# Patient Record
Sex: MALE | Race: BLACK OR AFRICAN AMERICAN | NOT HISPANIC OR LATINO | Employment: UNEMPLOYED | URBAN - METROPOLITAN AREA
[De-identification: names, ages, dates, MRNs, and addresses within clinical notes are randomized per-mention and may not be internally consistent; named-entity substitution may affect disease eponyms.]

---

## 2023-04-25 LAB
ANION GAP IN SER/PLAS: 10 MMOL/L (ref 10–20)
CALCIUM (MG/DL) IN SER/PLAS: 8.9 MG/DL (ref 8.6–10.3)
CARBON DIOXIDE, TOTAL (MMOL/L) IN SER/PLAS: 30 MMOL/L (ref 21–32)
CHLORIDE (MMOL/L) IN SER/PLAS: 103 MMOL/L (ref 98–107)
CREATININE (MG/DL) IN SER/PLAS: 1.17 MG/DL (ref 0.5–1.3)
ERYTHROCYTE DISTRIBUTION WIDTH (RATIO) BY AUTOMATED COUNT: 13.9 % (ref 11.5–14.5)
ERYTHROCYTE MEAN CORPUSCULAR HEMOGLOBIN CONCENTRATION (G/DL) BY AUTOMATED: 31.2 G/DL (ref 32–36)
ERYTHROCYTE MEAN CORPUSCULAR VOLUME (FL) BY AUTOMATED COUNT: 76 FL (ref 80–100)
ERYTHROCYTES (10*6/UL) IN BLOOD BY AUTOMATED COUNT: 5.68 X10E12/L (ref 4.5–5.9)
GFR MALE: 79 ML/MIN/1.73M2
GLUCOSE (MG/DL) IN SER/PLAS: 89 MG/DL (ref 74–99)
HEMATOCRIT (%) IN BLOOD BY AUTOMATED COUNT: 42.9 % (ref 41–52)
HEMOGLOBIN (G/DL) IN BLOOD: 13.4 G/DL (ref 13.5–17.5)
LEUKOCYTES (10*3/UL) IN BLOOD BY AUTOMATED COUNT: 3.6 X10E9/L (ref 4.4–11.3)
PLATELETS (10*3/UL) IN BLOOD AUTOMATED COUNT: 274 X10E9/L (ref 150–450)
POTASSIUM (MMOL/L) IN SER/PLAS: 3.7 MMOL/L (ref 3.5–5.3)
SODIUM (MMOL/L) IN SER/PLAS: 139 MMOL/L (ref 136–145)
UREA NITROGEN (MG/DL) IN SER/PLAS: 15 MG/DL (ref 6–23)

## 2023-04-26 LAB
CHLAMYDIA TRACH., AMPLIFIED: NEGATIVE
ESTIMATED AVERAGE GLUCOSE FOR HBA1C: 117 MG/DL
HEMOGLOBIN A1C/HEMOGLOBIN TOTAL IN BLOOD: 5.7 %
HIV 1/ 2 AG/AB SCREEN: NONREACTIVE
N. GONORRHEA, AMPLIFIED: NEGATIVE
SYPHILIS TOTAL AB: NONREACTIVE
TRICHOMONAS VAGINALIS: NEGATIVE
URINE CULTURE: NO GROWTH

## 2023-05-09 LAB
ALPHA-1 FETOPROTEIN (NG/ML) IN SER/PLAS: <4 NG/ML (ref 0–9)
DEHYDROEPIANDROSTERONE SULFATE (DHEA-S) (UG/DL) IN SER/: 168 UG/DL (ref 95–530)
FOLLITROPIN (IU/L) IN SER/PLAS: 2.9 IU/L
HCG TUMOR MARKER: <3 IU/L
LUTEINIZING HORMONE (IU/ML) IN SER/PLAS: 3 IU/L

## 2023-05-19 LAB — ESTRADIOL LC/MS/MS: 30 PG/ML

## 2023-05-20 LAB
ESTRADIOL (SJC): 28 PG/ML
ESTRADIOL FREE: 0.59 PG/ML

## 2023-11-09 ENCOUNTER — APPOINTMENT (OUTPATIENT)
Dept: OPHTHALMOLOGY | Facility: CLINIC | Age: 43
End: 2023-11-09
Payer: COMMERCIAL

## 2023-11-16 ENCOUNTER — OFFICE VISIT (OUTPATIENT)
Dept: OPHTHALMOLOGY | Facility: CLINIC | Age: 43
End: 2023-11-16
Payer: COMMERCIAL

## 2023-11-16 DIAGNOSIS — M35.2: Primary | ICD-10-CM

## 2023-11-16 DIAGNOSIS — Z79.899 LONG TERM CURRENT USE OF IMMUNOSUPPRESSIVE DRUG: ICD-10-CM

## 2023-11-16 DIAGNOSIS — H44.113 PANUVEITIS OF BOTH EYES: ICD-10-CM

## 2023-11-16 PROBLEM — Z79.60 LONG TERM CURRENT USE OF IMMUNOSUPPRESSIVE DRUG: Status: ACTIVE | Noted: 2023-11-16

## 2023-11-16 PROCEDURE — 92134 CPTRZ OPH DX IMG PST SGM RTA: CPT | Mod: BILATERAL PROCEDURE | Performed by: OPHTHALMOLOGY

## 2023-11-16 PROCEDURE — 99214 OFFICE O/P EST MOD 30 MIN: CPT | Performed by: OPHTHALMOLOGY

## 2023-11-16 ASSESSMENT — TONOMETRY
OD_IOP_MMHG: 19
OS_IOP_MMHG: 21
IOP_METHOD: GOLDMANN APPLANATION

## 2023-11-16 ASSESSMENT — VISUAL ACUITY
OS_SC: 20/
METHOD: SNELLEN - LINEAR
OD_SC: 20/20

## 2023-11-16 ASSESSMENT — SLIT LAMP EXAM - LIDS
COMMENTS: NORMAL
COMMENTS: NORMAL

## 2023-11-16 ASSESSMENT — EXTERNAL EXAM - LEFT EYE: OS_EXAM: NORMAL

## 2023-11-16 ASSESSMENT — EXTERNAL EXAM - RIGHT EYE: OD_EXAM: NORMAL

## 2023-11-16 NOTE — PROGRESS NOTES
Assessment/Plan   Diagnoses and all orders for this visit:  Behçet disease of small intestine (CMS/HCC)  -     OCT, Retina - OU - Both Eyes  Panuveitis of both eyes  Long term current use of immunosuppressive drug            This patient is placed on immunosuppressives in an effort to save the eye as well as manage the condition from a systemic point of view. There is no complicating pregnancy (if female will use two methods of prevention) or immunosupression associated disease. Risks are significant as well as the benefits. This was carefully discussed with the patient. The goals of therapy was delineated clearly and need for regular monitoring of systemic toxicty to minimize it was discussed. The absolute need for follow up was discussed.    Currently on Humira methotrexate and folic acid     4m

## 2023-11-20 ENCOUNTER — TELEMEDICINE (OUTPATIENT)
Dept: RHEUMATOLOGY | Facility: CLINIC | Age: 43
End: 2023-11-20
Payer: COMMERCIAL

## 2023-11-20 DIAGNOSIS — M35.2 BEHCET'S DISEASE (MULTI): Primary | ICD-10-CM

## 2023-11-20 PROCEDURE — 99214 OFFICE O/P EST MOD 30 MIN: CPT | Performed by: INTERNAL MEDICINE

## 2023-11-20 RX ORDER — METHOTREXATE 2.5 MG/1
12.5 TABLET ORAL
COMMUNITY
Start: 2023-04-21

## 2023-11-20 NOTE — PROGRESS NOTES
42 yr old BM with chronic leukopenia, oral sores and recurrent infections. My suspicion is that he has chronic idiopathic neutropenia. I would refer him to hematology to see if candidate for G-CSF as getting prostate infections.   Will repeat CBC.            electronically signed by Danyelle Jones     Virtual or Telephone Consent    An interactive audio and video telecommunication system which permits real time communications between the patient (at the originating site) and provider (at the distant site) was utilized to provide this telehealth service.   Verbal consent was requested and obtained from Dejon Fuentes on this date, 11/20/23 for a telehealth visit.                    Chief Complaint     9 month follow up visit. All over pain. Former Dr. Castro patient. LW      History of Present IllnessRecall     41 yr old BM with chronic leukopenia, oral and genital sores and inflammatory arthritis. Diagnosis was made by Dr. Knight.   Has responded in the past to colchicine. Otezla added in 2/2021 by Dr. Jaeger. Discontinued in 5/22  Has no uveitis based on eye exam records. I reviewed all the old records.   Leukopenia is unlikely from Behcet's. Saw Hematology and flow cytometry was normal. Anti-granulocyte antibody was negative. ASPEN and RF is negative. I believe he has Chronic idiopathic neutropenia â€“ Chronic idiopathic neutropenia (NATALIA) is characterized by longstanding neutropenia without an obvious cause. The clinical presentation of NATALIA in adulthood is variable, ranging from an asymptomatic, incidental finding to aphthous ulcers, gingivitis, and frequent infections.  Last eye exam in 11/2023  42 yr old BM with chronic leukopenia, oral sores and recurrent infections. My suspicion is that he has chronic idiopathic neutropenia.. Hem Onch did not think so.     HPI: At today's visit, the patient reports seeing Dr. Mary and Dr. Satinder Haider. Was  started on Humira by Dr. Mary in 1/22 and the  frequency of ulcers has decreased.   Has pain in ankles, knees, hips and neck. Has morning stiffness for 2 hours and after sitting.    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            Review of Systems  REVIEW OF SYSTEMS:  Constitutional: C/O fatigue  Skin: Has rash  Head: No headache, or hair loss  Neck: No difficulty swallowing or choking  Eyes: Has dry eyes  Mouth: Has dry mouth and oral ulcers  Pulmonary: No wheezing, pleurisy or SOB  Cardiovascular: No chest pain or palpitations  Gastrointestinal: No abdominal pain, nausea or blood in stool  Endocrine: Raynaud's -  Musculoskeletal: As H/P  Genitourinary: as noted in HPI.   Musculoskeletal: as noted in HPI.   All other systems have been reviewed and are negative for complaint.           Past Medical History  Problems    · History of Behcet's disease (136.1) (M35.2)   · Resolved Date: 02 Aug 2021   · Encounter for preventive health examination (V70.0) (Z00.00)   · Resolved Date: 01 Jan 1900   · History of chronic prostatitis (V13.89) (Z87.438)   · Resolved Date: 28 Feb 2021   · History of diabetes mellitus (V12.29) (Z86.39)   · History of dysuria (V13.00) (Z87.898)   · Resolved Date: 26 Apr 2019   · History of exposure to infectious disease (V45.89) (Z20.9)   · History of malignant neoplasm (V10.90) (Z85.9)   · History of neutropenia (V12.3) (Z86.2)   · History of prostatitis (V13.89) (Z87.438)   · History of urinary frequency (V13.09) (Z87.898)   · Resolved Date: 26 Feb 2021     Surgical History  Problems    · Denied: History Of Prior Surgery   · History of Knee surgery   · left knee   · History of Tonsillectomy     Family History  Father    · Family history of kidney disease (V18.69) (Z84.1)   · Family history of liver disease (V18.59) (Z83.79)  Multiple Family Members    · Family history of kidney disease (V18.69) (Z84.1)   · Family history of liver disease (V18.59) (Z83.79)     Social History  Problems    · Feels safe at home   ·  "Former smoker (V15.82) (Z87.891)   · Moderate exercise (V49.89) (Z78.9)   · No alcohol use   · No drug use   · Denied: History of Occasional alcohol use   · Denied: History of Smoker in home   · Denied: History of Tobacco use   quit 2 months ago   · Unprotected sex (V69.2) (Z72.51)     Allergies  Medication    · Penicillins   Angioedema;; Recorded By: Bjial Kellogg; 8/5/2016 9:41:43 AM   · sertraline   Adverse Reaction; Recorded By: Mesha Dow; 7/23/2021 10:00:02 AM  moderate \"mental disconnect\" reaction     Current Meds  Current Outpatient Medications   Medication Sig Dispense Refill    adalimumab (Humira Pen) 80 mg/0.8 mL pen injector kit pen-injector Inject 1 Pen (80 mg) under the skin see administration instructions.      methotrexate (Trexall) 2.5 mg tablet Take 5 tablets (12.5 mg total) by mouth 1 (one) time per week.       No current facility-administered medications for this visit.            Vitals       Physical Exam  PE:  General - NAD, sitting up in chair, well-groomed, pleasant, AAOx3  Head: Normocephalic, atraumatic  Eyes - PERRLA, EOMI. No conjunctiva injection.   Mouth/ENT - Moist oral and nasal mucosa.   Musculoskeletal -  .EXAMJOINTDETAILED,  Shoulders: Full ROM, without pain, no swelling, warmth or tenderness.  Elbows: Full ROM, without pain, no swelling, warmth or tenderness.  Wrists: Full ROM, without pain, no swelling, warmth or tenderness.  MCP: No swelling, warmth or tenderness. Metacarpal squeeze negative  PIP: No swelling, warmth or tenderness.  DIP: No swelling, warmth or tenderness.  Hands : 5/5.        Assessment/Plan: 43 yr old BM with chronic apthous and genital ulcers. Also, has joint pain. On a high dose of Humira. Following up with Dr. Arenas. Advised to see Dr. Adkins.    Reviewed and approved by CLAUDIA JO on 11/20/23 at 11:52 AM.    "

## 2023-11-28 ENCOUNTER — LAB (OUTPATIENT)
Dept: LAB | Facility: LAB | Age: 43
End: 2023-11-28
Payer: COMMERCIAL

## 2023-11-28 ENCOUNTER — OFFICE VISIT (OUTPATIENT)
Dept: RHEUMATOLOGY | Facility: CLINIC | Age: 43
End: 2023-11-28
Payer: COMMERCIAL

## 2023-11-28 VITALS
DIASTOLIC BLOOD PRESSURE: 64 MMHG | HEIGHT: 72 IN | SYSTOLIC BLOOD PRESSURE: 120 MMHG | HEART RATE: 75 BPM | WEIGHT: 184 LBS | BODY MASS INDEX: 24.92 KG/M2

## 2023-11-28 DIAGNOSIS — M35.2 BEHCET'S SYNDROME (MULTI): Primary | ICD-10-CM

## 2023-11-28 DIAGNOSIS — M35.2 BEHCET'S SYNDROME (MULTI): ICD-10-CM

## 2023-11-28 LAB
ALBUMIN SERPL BCP-MCNC: 4.2 G/DL (ref 3.4–5)
ALP SERPL-CCNC: 63 U/L (ref 33–120)
ALT SERPL W P-5'-P-CCNC: 20 U/L (ref 10–52)
ANION GAP SERPL CALC-SCNC: 11 MMOL/L (ref 10–20)
AST SERPL W P-5'-P-CCNC: 18 U/L (ref 9–39)
BILIRUB SERPL-MCNC: 0.5 MG/DL (ref 0–1.2)
BUN SERPL-MCNC: 22 MG/DL (ref 6–23)
CALCIUM SERPL-MCNC: 9.3 MG/DL (ref 8.6–10.6)
CHLORIDE SERPL-SCNC: 106 MMOL/L (ref 98–107)
CK SERPL-CCNC: 308 U/L (ref 0–325)
CO2 SERPL-SCNC: 29 MMOL/L (ref 21–32)
CREAT SERPL-MCNC: 1.07 MG/DL (ref 0.5–1.3)
CRP SERPL-MCNC: <0.1 MG/DL
ERYTHROCYTE [DISTWIDTH] IN BLOOD BY AUTOMATED COUNT: 14.4 % (ref 11.5–14.5)
ERYTHROCYTE [SEDIMENTATION RATE] IN BLOOD BY WESTERGREN METHOD: 4 MM/H (ref 0–15)
GFR SERPL CREATININE-BSD FRML MDRD: 88 ML/MIN/1.73M*2
GLUCOSE SERPL-MCNC: 140 MG/DL (ref 74–99)
HCT VFR BLD AUTO: 39.8 % (ref 41–52)
HGB BLD-MCNC: 12.3 G/DL (ref 13.5–17.5)
MCH RBC QN AUTO: 22.9 PG (ref 26–34)
MCHC RBC AUTO-ENTMCNC: 30.9 G/DL (ref 32–36)
MCV RBC AUTO: 74 FL (ref 80–100)
NRBC BLD-RTO: 0 /100 WBCS (ref 0–0)
PLATELET # BLD AUTO: 316 X10*3/UL (ref 150–450)
POTASSIUM SERPL-SCNC: 4.9 MMOL/L (ref 3.5–5.3)
PROT SERPL-MCNC: 6.7 G/DL (ref 6.4–8.2)
RBC # BLD AUTO: 5.36 X10*6/UL (ref 4.5–5.9)
SODIUM SERPL-SCNC: 141 MMOL/L (ref 136–145)
WBC # BLD AUTO: 4 X10*3/UL (ref 4.4–11.3)

## 2023-11-28 PROCEDURE — 1036F TOBACCO NON-USER: CPT | Performed by: INTERNAL MEDICINE

## 2023-11-28 PROCEDURE — 85027 COMPLETE CBC AUTOMATED: CPT

## 2023-11-28 PROCEDURE — 99214 OFFICE O/P EST MOD 30 MIN: CPT | Performed by: INTERNAL MEDICINE

## 2023-11-28 PROCEDURE — 80053 COMPREHEN METABOLIC PANEL: CPT

## 2023-11-28 PROCEDURE — 86140 C-REACTIVE PROTEIN: CPT

## 2023-11-28 PROCEDURE — 85652 RBC SED RATE AUTOMATED: CPT

## 2023-11-28 PROCEDURE — 36415 COLL VENOUS BLD VENIPUNCTURE: CPT

## 2023-11-28 PROCEDURE — 82550 ASSAY OF CK (CPK): CPT

## 2023-11-28 RX ORDER — FOLIC ACID 1 MG/1
0.4 TABLET ORAL DAILY
COMMUNITY

## 2023-11-28 ASSESSMENT — PAIN SCALES - GENERAL: PAINLEVEL: 2

## 2023-11-28 NOTE — PROGRESS NOTES
Subjective   Patient ID: Dejon Fuentes is a 43 y.o. male who presents for Joint Pain (Pain in knees, ankles and legs).    HPI  43 yr old BM with chronic leukopenia, oral and genital sores and inflammatory arthritis. Diagnosis was made by Dr. Knight.   Has responded in the past to colchicine. Otezla added in 2/2021 by Dr. Jaeger. Discontinued in 5/22  Has no uveitis based on eye exam records. I reviewed all the old records.   Leukopenia is unlikely from Behcet's. Saw Hematology and flow cytometry was normal. Anti-granulocyte antibody was negative. ASPEN and RF is negative. I believe he has Chronic idiopathic neutropenia â€“ Chronic idiopathic neutropenia (NATALIA) is characterized by longstanding neutropenia without an obvious cause. The clinical presentation of NATALIA in adulthood is variable, ranging from an asymptomatic, incidental finding to aphthous ulcers, gingivitis, and frequent infections.  Last eye exam in 11/2023  43 yr old BM with chronic leukopenia, oral sores and recurrent infections. My suspicion is that he has chronic idiopathic neutropenia.. Hem Onch did not think so.     HPI: The patient reports seeing Dr. Mary and Dr. Satinder Haider. Was  started on Humira by Dr. Mary in 1/22 and the frequency of ulcers has decreased.   Has pain in ankles, knees, hips and neck. Has morning stiffness for 2 hours and after sitting.   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      He feels tired, exhausted, almost for 8 years.  He is using Humira every week, increased its dose 2 months ago. Also, MTX was started in 04/23 and he is using 10 mg/week.  After switching his Humira to every week, he feels better and he is having less frequent oral ulcers.  He had an active oral ulcer for 2 weeks  He had a genital ulcer 2 months ago, he reports genital ulcer almost 2 times a year,  He is not sure about scar.  Also, he reports intermittent acne like lesions in her body.  He reports joint pain  in his hands, shoulder  He also reports AM stiffness around 2 hours.  Medications:  Humira for 1.5 years, increased its dose to once a week 2 months ago.  MTX 10 mg/week since 04/23    ROS  Joint pain in hands: negative  Joint swelling: negative  Morning stiffness and duration: positive   strength: normal  Oral ulcer: negative  Genital ulcer: negative  Raynaud phenomenon: negative  Chest pain/dyspnea: negative  Low back pain: negative  Visual problem: negative  Dry eyes/dry mouth: negative  Skin rash/scaling/psoriasis: negative       Objective     PEXAM  VS reviewed, WNL  General: Alert, no distress   HEENT: Normocephalic/atraumatic, No alopecia. PERRLA. Sclera white, conjunctiva pink, no malar rash. no oral or nasal ulcer. Oral cavity pink and moist, no erythema or exudate, dentition good.   Neck: supple  Respiratory: CTA B, no adventitious breath sounds  Cardiac: RRR, no murmurs, carotid, or bruits  Abdominal: symmetrical, soft, non-tender, non-distended, normoactive BSx4 quadrants, no CVA tenderness or suprapubic tenderness  MSK: Joints of upper and lower extremities were assessed for synovitis and ROM.    Today she has no evidence of synovitis in the joints of her hands or wrists, tender joint count 0, swollen joint count 0   Extremities: no clubbing, no cyanosis, no edema  Skin: Skin warm and moist.   +a few penile and scrotal scars  Neuro: non-focal, Strength 5/5 throughout. Normal gait. No cerebellar pathologic exam     Assessment/Plan   43 yr old BM with chronic apthous and genital ulcers.   Also, he reports acne like skin lesions.   His PExam showed genital scar in his penil area and scrotal area.  No eye problem, recent eye exam was NL.  He has h/o chronic neutropenia (>1000/mm3), Hematology did not think any special diagnosis.  Also, his h/o revealed recurrent tonsillitis and he had a tonsillectomy in 2017.  Reports recurrent low grade fever around 100 F  He is using Humira 40 mg every week for 1.5  years and MTX 10 mg since 04/23  He did not tolerate Colchicine and Otezla before.  His oral ulcers decreased after Humira.  His findings are consistent with Behcet (recurrent oral ulcers, genital scar and acne-like lesion.  However, Behcet does not explain his neutropenia, recurrent fever, fatigue.  He may have also PFAPA like periodic fever.  AZA may be more effective than MTX for Behcet related findings like oral, genital ulcers, but neutropenia required close f/u  No eye disease, vascular disease, CNS disease, Humira may be an aggressive treatment, but he feels better and it should be continued in this time.    -will see his ESR, CRP, CBC, CMP

## 2023-12-01 DIAGNOSIS — M35.2: Primary | ICD-10-CM

## 2023-12-05 ENCOUNTER — OFFICE VISIT (OUTPATIENT)
Dept: HEMATOLOGY/ONCOLOGY | Facility: HOSPITAL | Age: 43
End: 2023-12-05
Payer: COMMERCIAL

## 2023-12-05 VITALS
OXYGEN SATURATION: 100 % | BODY MASS INDEX: 24.82 KG/M2 | HEART RATE: 67 BPM | SYSTOLIC BLOOD PRESSURE: 125 MMHG | RESPIRATION RATE: 16 BRPM | WEIGHT: 182.98 LBS | TEMPERATURE: 98.2 F | DIASTOLIC BLOOD PRESSURE: 70 MMHG

## 2023-12-05 DIAGNOSIS — D72.818 OTHER DECREASED WHITE BLOOD CELL (WBC) COUNT: Primary | ICD-10-CM

## 2023-12-05 PROCEDURE — 99215 OFFICE O/P EST HI 40 MIN: CPT | Performed by: INTERNAL MEDICINE

## 2023-12-05 PROCEDURE — 1036F TOBACCO NON-USER: CPT | Performed by: INTERNAL MEDICINE

## 2023-12-05 ASSESSMENT — PAIN SCALES - GENERAL: PAINLEVEL: 5

## 2023-12-06 ENCOUNTER — TELEPHONE (OUTPATIENT)
Dept: HEMATOLOGY/ONCOLOGY | Facility: HOSPITAL | Age: 43
End: 2023-12-06
Payer: COMMERCIAL

## 2023-12-07 ENCOUNTER — APPOINTMENT (OUTPATIENT)
Dept: PRIMARY CARE | Facility: CLINIC | Age: 43
End: 2023-12-07
Payer: COMMERCIAL

## 2023-12-08 NOTE — PROGRESS NOTES
Patient ID: Dejon Fuentes is a 43 y.o. male.    Assessment/Plan      H/O leukopenia  Patient is referred for consultation regarding histor of leukopenia.  He has been previously evaluated for intermittent lymphopenia and neutropenia by both Shankar Guzman in 2020 and Megan in 2022.  Evaluation included negative nutritional studies,  normal flow cytometry, etc.  More recently while he has had total leukopenia, he has not had absolute lymphopenia or neutropenia.  He denies history of severe or unusual infections, and inquires about the need for GCSF.  Given the chronicity of these intermittent findings and lack of clinical impact, I would not recommend any treatment as this is unlikely secondary to primary bone marrow process and either normal variant for patient or secondary to rheumatologic issues.  If secondary caus severity would not warrant therapy, and may have potential to exacerbate rheumatologic issues.  Further hematologic evaluation is not indicated at this time.  He will return to the care of Dr. Adkins.    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  No intervention or follow up needed.    ________________________________________________________________________________________________________________________________________  Subjective       Referred by Dr. Adkins for history of abnormal counts.  He has been followed and treated for some time for Behcet's like syndrome.  During treatment he has had CBC checked periodically with intermittent mild neutropenia and/or lymphopenia. Denies fevers, chills, nausea, vomiting, diarrhea, dyspnea, rash, and pain.          Objective    BSA: 2.05 meters squared  /70   Pulse 67   Temp 36.8 °C (98.2 °F)   Resp 16   Wt 83 kg (182 lb 15.7 oz)   SpO2 100%   BMI 24.82 kg/m²      Physical Exam  Vitals reviewed.   Constitutional:       Appearance: Normal appearance.   Eyes:      Conjunctiva/sclera: Conjunctivae normal.      Pupils: Pupils are  equal, round, and reactive to light.   Skin:     General: Skin is warm and dry.      Findings: No rash.   Psychiatric:         Mood and Affect: Mood normal.              Leonora Bishop MD

## 2023-12-13 ENCOUNTER — APPOINTMENT (OUTPATIENT)
Dept: NEUROLOGY | Facility: HOSPITAL | Age: 43
End: 2023-12-13
Payer: COMMERCIAL

## 2024-01-10 DIAGNOSIS — Z00.00 HEALTHCARE MAINTENANCE: ICD-10-CM

## 2024-01-24 RX ORDER — AMOXICILLIN 500 MG
CAPSULE ORAL
COMMUNITY

## 2024-01-24 RX ORDER — ANASTROZOLE 1 MG/1
TABLET ORAL
COMMUNITY
Start: 2021-03-02

## 2024-01-24 RX ORDER — AZITHROMYCIN 250 MG/1
TABLET, FILM COATED ORAL
COMMUNITY
Start: 2023-05-18

## 2024-01-24 RX ORDER — FAMOTIDINE 20 MG/1
20 TABLET, FILM COATED ORAL 2 TIMES DAILY
COMMUNITY
Start: 2023-12-06

## 2024-01-24 RX ORDER — IBUPROFEN 100 MG/5ML
SUSPENSION, ORAL (FINAL DOSE FORM) ORAL
COMMUNITY

## 2024-01-24 RX ORDER — LIDOCAINE HYDROCHLORIDE 40 MG/ML
SOLUTION TOPICAL
COMMUNITY
Start: 2023-04-25

## 2024-01-24 RX ORDER — L. ACIDOPHILUS/L.BULGARICUS 1MM CELL
1 TABLET ORAL DAILY
COMMUNITY
Start: 2023-04-25

## 2024-01-24 RX ORDER — L. ACIDOPHILUS/L.BULGARICUS 1MM CELL
TABLET ORAL
COMMUNITY
Start: 2023-05-02

## 2024-01-24 RX ORDER — ATOVAQUONE 750 MG/5ML
SUSPENSION ORAL
COMMUNITY
Start: 2023-05-18

## 2024-01-24 RX ORDER — ERGOCALCIFEROL 1.25 MG/1
CAPSULE ORAL
COMMUNITY
Start: 2023-10-19

## 2024-01-24 RX ORDER — EPINEPHRINE 0.3 MG/.3ML
0.3 INJECTION SUBCUTANEOUS
COMMUNITY
Start: 2020-04-15

## 2024-01-24 RX ORDER — MAGNESIUM GLUCONATE 27 MG(500)
1 TABLET ORAL NIGHTLY
COMMUNITY
Start: 2021-03-19 | End: 2024-01-25 | Stop reason: SDUPTHER

## 2024-01-24 RX ORDER — MULTIVIT-MIN/FERROUS FUMARATE 9 MG/15 ML
LIQUID (ML) ORAL
COMMUNITY
Start: 2021-09-15

## 2024-01-24 RX ORDER — MELOXICAM 7.5 MG/1
7.5 TABLET ORAL
COMMUNITY
Start: 2023-12-06

## 2024-01-24 RX ORDER — GLUTAMINE
5 POWDER (GRAM) MISCELLANEOUS
COMMUNITY
Start: 2023-10-19

## 2024-01-24 RX ORDER — ACETAMINOPHEN 325 MG/1
TABLET ORAL
COMMUNITY
Start: 2021-10-22

## 2024-01-24 RX ORDER — LANOLIN ALCOHOL/MO/W.PET/CERES
1000 CREAM (GRAM) TOPICAL
COMMUNITY
Start: 2023-10-13

## 2024-01-24 RX ORDER — BUSPIRONE HYDROCHLORIDE 5 MG/1
TABLET ORAL
COMMUNITY
Start: 2023-05-05

## 2024-01-24 RX ORDER — BETAMETHASONE DIPROPIONATE 0.5 MG/G
OINTMENT, AUGMENTED TOPICAL
COMMUNITY
Start: 2020-02-05

## 2024-01-24 RX ORDER — APREMILAST 30 MG/1
1 TABLET, FILM COATED ORAL
COMMUNITY

## 2024-01-24 RX ORDER — GUAIFENESIN 600 MG/1
TABLET, EXTENDED RELEASE ORAL
COMMUNITY
Start: 2022-07-05

## 2024-01-24 RX ORDER — ACETAMINOPHEN AND CODEINE PHOSPHATE 300; 30 MG/1; MG/1
TABLET ORAL 4 TIMES DAILY
COMMUNITY
Start: 2021-02-26

## 2024-01-24 RX ORDER — COLCHICINE 0.6 MG/1
1 TABLET ORAL
COMMUNITY
Start: 2020-08-17

## 2024-01-24 RX ORDER — BENZOYL PEROXIDE 50 MG/ML
LIQUID TOPICAL
COMMUNITY
Start: 2023-08-08

## 2024-01-25 ENCOUNTER — OFFICE VISIT (OUTPATIENT)
Dept: PRIMARY CARE | Facility: CLINIC | Age: 44
End: 2024-01-25
Payer: COMMERCIAL

## 2024-01-25 VITALS
OXYGEN SATURATION: 96 % | SYSTOLIC BLOOD PRESSURE: 125 MMHG | WEIGHT: 188 LBS | HEIGHT: 72 IN | TEMPERATURE: 96.3 F | BODY MASS INDEX: 25.47 KG/M2 | HEART RATE: 75 BPM | DIASTOLIC BLOOD PRESSURE: 77 MMHG

## 2024-01-25 DIAGNOSIS — Z59.00 HOMELESSNESS: ICD-10-CM

## 2024-01-25 DIAGNOSIS — M35.2: ICD-10-CM

## 2024-01-25 DIAGNOSIS — G47.00 INSOMNIA, UNSPECIFIED TYPE: ICD-10-CM

## 2024-01-25 DIAGNOSIS — K59.00 CONSTIPATION, UNSPECIFIED CONSTIPATION TYPE: Primary | ICD-10-CM

## 2024-01-25 PROCEDURE — 1036F TOBACCO NON-USER: CPT | Performed by: NURSE PRACTITIONER

## 2024-01-25 PROCEDURE — 99214 OFFICE O/P EST MOD 30 MIN: CPT | Performed by: NURSE PRACTITIONER

## 2024-01-25 RX ORDER — PSYLLIUM HUSK 3.4 G/5.8G
1 POWDER ORAL DAILY
Qty: 283 G | Refills: 3 | Status: SHIPPED | OUTPATIENT
Start: 2024-01-25

## 2024-01-25 RX ORDER — MAGNESIUM GLUCONATE 27 MG(500)
1 TABLET ORAL NIGHTLY
Qty: 90 TABLET | Refills: 3 | Status: SHIPPED | OUTPATIENT
Start: 2024-01-25

## 2024-01-25 SDOH — ECONOMIC STABILITY - HOUSING INSECURITY: HOMELESSNESS UNSPECIFIED: Z59.00

## 2024-01-25 ASSESSMENT — PATIENT HEALTH QUESTIONNAIRE - PHQ9
1. LITTLE INTEREST OR PLEASURE IN DOING THINGS: NOT AT ALL
2. FEELING DOWN, DEPRESSED OR HOPELESS: NOT AT ALL
SUM OF ALL RESPONSES TO PHQ9 QUESTIONS 1 AND 2: 0

## 2024-01-25 ASSESSMENT — PAIN SCALES - GENERAL: PAINLEVEL: 5

## 2024-01-25 NOTE — PATIENT INSTRUCTIONS
Thank you for coming in for your visit today!    Please follow up in 6 months or sooner if needed.     One thing at a time!     Get connected with a community health worker and dietician.    The new fiber supplement was sent to your pharmacy.   Slowly increase your dose.         Call 911 or go to the emergency room if you have pain in your chest, difficulty breathing, or other life threatening symptoms.

## 2024-01-25 NOTE — PROGRESS NOTES
Subjective   Dejon Fuentse is a 43 y.o. male who presents for autoimmune issue.  HPI  Dejon is a 44yo here today for follow up  Is now well followed by rheumatology, hematology, and functional medicine through [facility] for treatment of Bechet's, chronic neutropenia, and chronic fatigue.     Notes that he has been battling his health but is finding better stride with specialist connection. Has tried various medications for maintenance of Bechet's. Found that methotrexate worked well for a while and then really stopped providing benefit.     Requesting higher dose metamucil for continued bowel regulation.     Admits to house insecurity due to ongoing barriers to his health including immunocompromised state, generalized fatigue, and ongoing discomfort. He will travel to [place] when possible because of the very low cost of living benefits. Expresses the ongoing challenges with his disability and how it may not be visible to others.     All systems reviewed. Review of systems negative except for noted positives in HPI    Objective     /77   Pulse 75   Temp 35.7 °C (96.3 °F)   Ht 1.829 m (6')   Wt 85.3 kg (188 lb)   SpO2 96%   BMI 25.50 kg/m²    Vital signs noted and reviewed.       Physical Exam  Constitutional:       Appearance: Normal appearance.   Cardiovascular:      Rate and Rhythm: Normal rate and regular rhythm.   Pulmonary:      Effort: Pulmonary effort is normal. No respiratory distress.      Breath sounds: Normal breath sounds.   Skin:     General: Skin is warm and dry.   Neurological:      Mental Status: He is oriented to person, place, and time.   Psychiatric:         Mood and Affect: Mood normal.             Assessment/Plan   Problem List Items Addressed This Visit       Behçet disease of small intestine (CMS/HCC)    Relevant Medications    psyllium husk, aspartame, (Metamucil Sugar-Free, aspart,) 3.4 gram/5.8 gram powder     Other Visit Diagnoses       Constipation, unspecified  constipation type    -  Primary    Relevant Medications    psyllium husk, aspartame, (Metamucil Sugar-Free, aspart,) 3.4 gram/5.8 gram powder    Other Relevant Orders    Referral to Nutrition Services    Homelessness        Relevant Orders    Referral to Community Health Worker    Insomnia, unspecified type        Relevant Medications    melatonin 10 mg tablet extended release

## 2024-01-30 ENCOUNTER — NUTRITION (OUTPATIENT)
Dept: NUTRITION | Facility: HOSPITAL | Age: 44
End: 2024-01-30
Payer: COMMERCIAL

## 2024-01-30 VITALS — BODY MASS INDEX: 25.5 KG/M2 | HEIGHT: 72 IN

## 2024-01-30 DIAGNOSIS — Z71.3 DIETARY COUNSELING: Primary | ICD-10-CM

## 2024-01-30 DIAGNOSIS — K59.00 CONSTIPATION, UNSPECIFIED CONSTIPATION TYPE: ICD-10-CM

## 2024-01-30 PROCEDURE — 97802 MEDICAL NUTRITION INDIV IN: CPT | Performed by: DIETITIAN, REGISTERED

## 2024-01-30 NOTE — PROGRESS NOTES
Reason for Nutrition Visit:  Pt is a 43 y.o. male being seen at [facility] [place] referred for   1. Dietary counseling        2. Constipation, unspecified constipation type  Referral to Nutrition Services         Nutrition Assessment      Past Medical Hx:  Patient Active Problem List   Diagnosis    Behçet disease of small intestine (CMS/HCC)    Panuveitis of both eyes    Long term current use of immunosuppressive drug     Lab Results   Component Value Date    HGBA1C 5.7 (A) 04/25/2023    HGBA1C 5.6 09/01/2022    CHOL 139 02/04/2019    TRIG 28 (LL) 02/04/2019    HDL 48.1 02/04/2019      Food and Nutrient History: Pt is here today for nutritional assessment. He has Behcet's disease which has caused ulcers in his mouth and he's missing about 7 teeth. He received temporary dental implants last year, but prior to the implants he had been losing weight and was his lowest weight of 163# in May of 2023. Since getting his temporary implants, he has gained nack 25#. He endorses that without the implants he could only eat blended foods like spinach, oats and couldn't eat meat except for ground beef. Prior to the implants, he had a poor appetite due to the inflammation in the ulcers and couldn't eat due the ulcer pain in his mouth. He was also found to be deficient in vitamin B12 and D when he didn't have his implants. He lives in a homeless shelter and unable to prepare his own foods, which is another barrier that if he didn't have implants would likely not be able to eat the food unless it was blended for him.     Dietary Recall:  Meal 1: 2 eggs and a bagel with OJ  Meal 2: salad with a cookie, pasta  Meal 3: baked beans and hot dogs     Fluid Intake: 32 oz water, 2 cups of coffee    Appetite: Good  Food Allergy: shellfish and lobster  Food Intolerance: n/a  GI Symptoms: nausea (reports loose stools) GI Symptoms greater than 2 weeks: intermittent  Oral Problems: denies  Dentition: poor condition  Sleep Duration/Quality: 7+ hrs disrupted  (wakes up about 2x per week)    Vitamin Intake: D, B12   Nutrition-Related Complementary/Alternative Medicine Use: fiber supplements    Physical Activity History: Exercises or walks for 15 minutes daily     Nutrition Focused Physical Exam:    Performed/Deferred: Performed    Muscle Wasting:  Temporalis: Well nourished (well-defined muscle)  Pectoralis (Clavicular Region): Mild-Moderate (some protrusion of clavicle)  Deltoid/Trapezius: Well nourished (rounded appearance at arm, shoulder, neck)  Interosseous: Well nourished (muscle bulges)  Quadriceps: Well nourished (well developed, well rounded)    Loss of Subcutaneous Fat:  Orbital Fat Pads: Well nourshed (slightly bulging fat pads)  Buccal Fat Pads: Well nourished (full, rounded cheeks)  Triceps: Well nourished (ample fat tissue)  Ribs: Well nourished (full chest, ribs do not protrude)    Other Physical Findings:  Hair: Negative  Eyes: Negative  Mouth: Negative  Skin: Negative  Nails: Positive (reports nail breaking easily)    Estimated Energy Needs:    Total Energy Estimated Needs (kCal): 2137 kCal   Method for Estimating Needs: MSJ: 1781x1.2   Total Protein Estimated Needs (g): 85.45 g   Total Protein Estimated Needs (g/kg): 1 g/kg  Nutrition Diagnosis     Patient has Nutrition Diagnosis: Yes Diagnosis Status (1): New  Nutrition Diagnosis 1: Biting/chewing (masticatory) difficility Related to (1): missing teeth and mouth ulcer's due to Bechet's disease As Evidenced by (1): pt unable to chew regular foods unless mechanically blended, and poor appetite/food intake due to mouth ulcers.     Diagnosis Status (2): New Nutrition Diagnosis 2: Limited food acceptance  Nutrition Diagnosis 2: Limited food acceptance Related to (2): pain and discomfort in mouth due to Bechet's disease As Evidenced by (2): pt reporting that without dental implants he has a poor appetite with reduced oral intake likely meeting less than 50% of estimated needs and losing body weight with  lowest weight recorded of 163# in May of 2021 prior to temporary implants.     Diagnosis Status (3): New Nutrition Diagnosis 3: Limited access to food  Nutrition Diagnosis 3: Limited access to food Related to (3): food insecurity As Evidenced by (3): pt unable to safely prepare and keep foods due to living in a homeless shelter and relies on food provided from the facility to eat but unable to afford permanent dental implants to help him consume the foods provide from the shelter.     Nutrition Interventions/Recommendations   Individualized Nutrition Prescription Provided for : Increased protein, fiber, omega-3 fats, and complex carbohydrates with reduced sodium, saturated fat and sodium  Meals & Snacks: Fiber-modified diet, Protein-modified diet, Modify Composition of Meals/Snacks  Goals: Consistent meal/snack pattern with adequate intake of protein and fiber    Strategies: Nutrition counseling based on motivational interviewing strategy, Nutrition counseling based on goal setting strategy    Nutrition Monitoring and Evaluation   Monitoring and Evaluation Plan: Meal/snack pattern, Protein intake, Fiber intake Meal/Snack Pattern: Estimated meal and snack pattern, Food variety  Monitoring and Evaluation Plan: Food and nutrition knowledge Criteria: Ability to choose healthful foods that increase satiety and physical fullness  Monitoring and Evaluation Plan: Glucose/endocrine profile Glucose/Endocrine Profile: Hemoglobin A1c (HgbA1c) Criteria: <5.7%    Nutrition Recommendations:  1) Dental implants are necessary for pt to consume regular foods. Without implants pt will likely be unable to support dietary needs for nutrients and weight due to hx showing vitamin deficiencies and weight loss before pt had temporary implants. Most of pt's food is being prepared by homeless shelter where mechanical alteration of the food to appropriate texture for his condition would not be available unless he had dental implants and can  eat the food in it's unaltered form.    Educational Handouts: n/a    Readiness to Change : Good  Level of Understanding : Good  Anticipated Compliant : Good

## 2024-04-24 ENCOUNTER — TELEPHONE (OUTPATIENT)
Dept: CARE COORDINATION | Facility: CLINIC | Age: 44
End: 2024-04-24
Payer: COMMERCIAL

## 2024-09-15 ENCOUNTER — HOSPITAL ENCOUNTER (INPATIENT)
Facility: HOSPITAL | Age: 44
LOS: 1 days | Discharge: HOME | End: 2024-09-16
Attending: EMERGENCY MEDICINE | Admitting: STUDENT IN AN ORGANIZED HEALTH CARE EDUCATION/TRAINING PROGRAM
Payer: COMMERCIAL

## 2024-09-15 DIAGNOSIS — R19.7 INTRACTABLE DIARRHEA: ICD-10-CM

## 2024-09-15 DIAGNOSIS — R11.0 NAUSEA: ICD-10-CM

## 2024-09-15 DIAGNOSIS — R19.7 DIARRHEA, UNSPECIFIED TYPE: Primary | ICD-10-CM

## 2024-09-15 LAB
ALBUMIN SERPL BCP-MCNC: 4.2 G/DL (ref 3.4–5)
ALP SERPL-CCNC: 58 U/L (ref 33–120)
ALT SERPL W P-5'-P-CCNC: 28 U/L (ref 10–52)
ANION GAP SERPL CALC-SCNC: 10 MMOL/L (ref 10–20)
AST SERPL W P-5'-P-CCNC: 16 U/L (ref 9–39)
BILIRUB DIRECT SERPL-MCNC: 0 MG/DL (ref 0–0.3)
BILIRUB SERPL-MCNC: 0.3 MG/DL (ref 0–1.2)
BUN SERPL-MCNC: 15 MG/DL (ref 6–23)
CALCIUM SERPL-MCNC: 9.1 MG/DL (ref 8.6–10.6)
CHLORIDE SERPL-SCNC: 103 MMOL/L (ref 98–107)
CO2 SERPL-SCNC: 29 MMOL/L (ref 21–32)
CREAT SERPL-MCNC: 1.37 MG/DL (ref 0.5–1.3)
EGFRCR SERPLBLD CKD-EPI 2021: 65 ML/MIN/1.73M*2
FERRITIN SERPL-MCNC: 193 NG/ML (ref 20–300)
FLUAV RNA RESP QL NAA+PROBE: NOT DETECTED
FLUBV RNA RESP QL NAA+PROBE: NOT DETECTED
GLUCOSE SERPL-MCNC: 89 MG/DL (ref 74–99)
HAPTOGLOB SERPL NEPH-MCNC: 111 MG/DL (ref 30–200)
HAV IGM SER QL: NONREACTIVE
HBV CORE IGM SER QL: NONREACTIVE
HBV SURFACE AG SERPL QL IA: NONREACTIVE
HCV AB SER QL: NONREACTIVE
IRON SATN MFR SERPL: 10 % (ref 25–45)
IRON SERPL-MCNC: 31 UG/DL (ref 35–150)
LDH SERPL L TO P-CCNC: 185 U/L (ref 84–246)
MAGNESIUM SERPL-MCNC: 1.68 MG/DL (ref 1.6–2.4)
POTASSIUM SERPL-SCNC: 3.8 MMOL/L (ref 3.5–5.3)
PROT SERPL-MCNC: 7.2 G/DL (ref 6.4–8.2)
SARS-COV-2 RNA RESP QL NAA+PROBE: NOT DETECTED
SODIUM SERPL-SCNC: 138 MMOL/L (ref 136–145)
TIBC SERPL-MCNC: 303 UG/DL (ref 240–445)
UIBC SERPL-MCNC: 272 UG/DL (ref 110–370)

## 2024-09-15 PROCEDURE — 87040 BLOOD CULTURE FOR BACTERIA: CPT

## 2024-09-15 PROCEDURE — 99285 EMERGENCY DEPT VISIT HI MDM: CPT

## 2024-09-15 PROCEDURE — 87207 SMEAR SPECIAL STAIN: CPT

## 2024-09-15 PROCEDURE — 83615 LACTATE (LD) (LDH) ENZYME: CPT

## 2024-09-15 PROCEDURE — 36415 COLL VENOUS BLD VENIPUNCTURE: CPT

## 2024-09-15 PROCEDURE — 87636 SARSCOV2 & INF A&B AMP PRB: CPT

## 2024-09-15 PROCEDURE — 1210000001 HC SEMI-PRIVATE ROOM DAILY

## 2024-09-15 PROCEDURE — 80074 ACUTE HEPATITIS PANEL: CPT

## 2024-09-15 PROCEDURE — 82248 BILIRUBIN DIRECT: CPT

## 2024-09-15 PROCEDURE — 85025 COMPLETE CBC W/AUTO DIFF WBC: CPT

## 2024-09-15 PROCEDURE — 82728 ASSAY OF FERRITIN: CPT

## 2024-09-15 PROCEDURE — 2500000001 HC RX 250 WO HCPCS SELF ADMINISTERED DRUGS (ALT 637 FOR MEDICARE OP): Mod: SE | Performed by: EMERGENCY MEDICINE

## 2024-09-15 PROCEDURE — 83010 ASSAY OF HAPTOGLOBIN QUANT: CPT

## 2024-09-15 PROCEDURE — 87040 BLOOD CULTURE FOR BACTERIA: CPT | Performed by: EMERGENCY MEDICINE

## 2024-09-15 PROCEDURE — 99285 EMERGENCY DEPT VISIT HI MDM: CPT | Performed by: EMERGENCY MEDICINE

## 2024-09-15 PROCEDURE — 87389 HIV-1 AG W/HIV-1&-2 AB AG IA: CPT

## 2024-09-15 PROCEDURE — 80053 COMPREHEN METABOLIC PANEL: CPT

## 2024-09-15 PROCEDURE — 86140 C-REACTIVE PROTEIN: CPT

## 2024-09-15 PROCEDURE — 83735 ASSAY OF MAGNESIUM: CPT | Performed by: EMERGENCY MEDICINE

## 2024-09-15 PROCEDURE — 86790 VIRUS ANTIBODY NOS: CPT

## 2024-09-15 PROCEDURE — 99222 1ST HOSP IP/OBS MODERATE 55: CPT

## 2024-09-15 PROCEDURE — 83540 ASSAY OF IRON: CPT

## 2024-09-15 RX ORDER — OXYCODONE HYDROCHLORIDE 5 MG/1
5 TABLET ORAL ONCE
Status: COMPLETED | OUTPATIENT
Start: 2024-09-15 | End: 2024-09-15

## 2024-09-15 RX ORDER — ONDANSETRON HYDROCHLORIDE 2 MG/ML
4 INJECTION, SOLUTION INTRAVENOUS ONCE
Status: DISCONTINUED | OUTPATIENT
Start: 2024-09-15 | End: 2024-09-16 | Stop reason: HOSPADM

## 2024-09-15 RX ADMIN — OXYCODONE HYDROCHLORIDE 5 MG: 5 TABLET ORAL at 20:29

## 2024-09-15 ASSESSMENT — COLUMBIA-SUICIDE SEVERITY RATING SCALE - C-SSRS
1. IN THE PAST MONTH, HAVE YOU WISHED YOU WERE DEAD OR WISHED YOU COULD GO TO SLEEP AND NOT WAKE UP?: NO
6. HAVE YOU EVER DONE ANYTHING, STARTED TO DO ANYTHING, OR PREPARED TO DO ANYTHING TO END YOUR LIFE?: NO
2. HAVE YOU ACTUALLY HAD ANY THOUGHTS OF KILLING YOURSELF?: NO

## 2024-09-15 ASSESSMENT — PAIN - FUNCTIONAL ASSESSMENT: PAIN_FUNCTIONAL_ASSESSMENT: 0-10

## 2024-09-15 NOTE — ED PROVIDER NOTES
History of Present Illness     History provided by: Patient  Limitations to History: None  External Records Reviewed with Brief Summary: None    HPI:  Dejon Fuentes is a 44 y.o. male who presents to the ED with chief complaint of nausea and diarrhea for 3 days.  He said this started after he ate food at an airport in [facility].  He was recently in [place] for 6 weeks and did not have any pretravel vaccinations or medications.  He denied any vomiting, and there is no blood in his diarrhea.  He said 3 weeks ago he was experiencing bilateral eye pain with yellowing of the eyes, joint pain, and waxing and waning self-reported fever for 2 weeks.  He said this has never happened before.  He does not take any daily meds.  He said he takes Humira once a week. No trauma, falls, or injuries. He currently still reports some joint pains. No passing out. No headache, dizziness, vision changes, neck stiffness, chest pain, shortness of breath, abdominal pain, urinary symptoms, numbness, or tingling. He denies any sick contacts. He reports taking some tylenol at home for his joint pains without much improvement.     Physical Exam   Triage vitals:  T 36.6 °C (97.9 °F)  HR 89  BP (!) 140/96  RR 16  O2 98 % None (Room air)    General: Awake, alert, oriented, in no acute distress. Resting calmly.  Eyes: Gaze conjugate. No scleral icterus or injection  HENT: Normo-cephalic, atraumatic. Mucous membranes moist. No stridor  Neck: supple, full ROM Intact, no meningeal signs.   CV: Regular rate, regular rhythm. Radial pulses 2+ bilaterally  Resp: Breathing non-labored, speaking in full sentences.  Clear to auscultation bilaterally  GI: Soft, non-distended, non-tender. No rebound or guarding. No peritoneal signs.  MSK/Extremities: No gross bony deformities. Moving all extremities with good tone and full ROM intact throughout.   Skin: Warm. Intact. Appropriate color  Neuro: Alert. Oriented. Face symmetric. Speech is fluent.  No  focal deficits. Strength 5/5 in upper and lower extremities bilaterally. Ambulates with normal gait.   Psych: Appropriate mood and affect    Medical Decision Making & ED Course   Medical Decision Makin y.o. male who presented to the ED with 3 days of nausea and nonbloody diarrhea after recently coming back from a 6-week trip to [place].  He said that 3 weeks ago he had 2 weeks of fever, joint pain, and yellowing of eyes.  He reports that the fever and yellowing of eyes  have since spontaneously resolved. However, he still reports some generalized joint pains.  Due to lack of pretravel vaccinations or medications, there was a concern for a possible infectious process.  Infectious disease was consulted and patient was discussed with Infectious Disease who provided recommendations on what labs they would like to order for this patient and these were then ordered.  Infectious Disease also wanted patient to be admitted overnight and they would consult tomorrow in person. While in the ED, the patient was ordered IV fluids and oxycodone to treat his pain. CBC showed white count of 5.2. Hemoglobin is 12.3. CMP was unremarkable. Magnesium and phosphorus were normal. Acute hepatitis panel was nonreactive for all. Admission coordinator was contacted and patient was accepted to medicine under Dr. Whiteside. Patient remains stable and is agreeable to admission.   ----      Differential diagnoses considered include but are not limited to: Traveler's diarrhea, yellow fever, dengue, malaria, Salmonella typhi, food poisoning, infectious diarrhea, gastritis, gastroenteritis, colitis, viral process, electrolyte abnormality, dehydration     Social Determinants of Health which Significantly Impact Care: None identified   EKG Independent Interpretation: EKG not obtained    Independent Result Review and Interpretation: Relevant laboratory and radiographic results were reviewed and independently interpreted by myself.  As necessary,  they are commented on in the ED Course.    Chronic conditions affecting the patient's care: As documented above in MDM    The patient was discussed with the following consultants/services: Admission Coordinator who accepted the patient for admission, infectious disease    Care Considerations: As documented above in MDM    ED Course:  Diagnoses as of 09/16/24 0059   Diarrhea, unspecified type   Nausea     Disposition   As a result of their workup, the patient will require admission to the hospital.  The patient was informed of his diagnosis.  The patient was given the opportunity to ask questions and I answered them. The patient agreed to be admitted to the hospital.    Procedures   Procedures    Patient seen and discussed with ED attending physician.    Gayathri Conley DO  Emergency Medicine     Gayathri Conley DO  Resident  09/16/24 0104       Deb Gunderson MD  09/17/24 [id number]

## 2024-09-15 NOTE — ED TRIAGE NOTES
Pt presents to ED after returning from [place] this morning, c/o diarrhea x3 days, joint pain, and nausea. Pt recently had a fever for 2 weeks and states his eyes were yellow. Pt was in [place] for approx 6 weeks.

## 2024-09-16 VITALS
TEMPERATURE: 97.8 F | BODY MASS INDEX: 25.73 KG/M2 | OXYGEN SATURATION: 97 % | HEIGHT: 72 IN | HEART RATE: 68 BPM | SYSTOLIC BLOOD PRESSURE: 140 MMHG | DIASTOLIC BLOOD PRESSURE: 85 MMHG | RESPIRATION RATE: 18 BRPM | WEIGHT: 190 LBS

## 2024-09-16 PROBLEM — N17.9 AKI (ACUTE KIDNEY INJURY) (CMS-HCC): Status: ACTIVE | Noted: 2024-09-16

## 2024-09-16 PROBLEM — A23.9: Status: ACTIVE | Noted: 2024-09-16

## 2024-09-16 PROBLEM — A03.9 SHIGELLA ENTERITIS: Status: ACTIVE | Noted: 2024-09-16

## 2024-09-16 LAB
ALBUMIN SERPL BCP-MCNC: 4.5 G/DL (ref 3.4–5)
ALP SERPL-CCNC: 60 U/L (ref 33–120)
ALT SERPL W P-5'-P-CCNC: 29 U/L (ref 10–52)
AMPHETAMINES UR QL SCN: ABNORMAL
ANION GAP SERPL CALC-SCNC: 14 MMOL/L (ref 10–20)
APPEARANCE UR: CLEAR
AST SERPL W P-5'-P-CCNC: 26 U/L (ref 9–39)
BARBITURATES UR QL SCN: ABNORMAL
BASOPHILS # BLD AUTO: 0.01 X10*3/UL (ref 0–0.1)
BASOPHILS NFR BLD AUTO: 0.2 %
BENZODIAZ UR QL SCN: ABNORMAL
BILIRUB SERPL-MCNC: 0.5 MG/DL (ref 0–1.2)
BILIRUB UR STRIP.AUTO-MCNC: NEGATIVE MG/DL
BUN SERPL-MCNC: 12 MG/DL (ref 6–23)
BZE UR QL SCN: ABNORMAL
C COLI+JEJ+UPSA DNA STL QL NAA+PROBE: NOT DETECTED
CALCIUM SERPL-MCNC: 9.4 MG/DL (ref 8.6–10.6)
CANNABINOIDS UR QL SCN: ABNORMAL
CHLORIDE SERPL-SCNC: 105 MMOL/L (ref 98–107)
CO2 SERPL-SCNC: 24 MMOL/L (ref 21–32)
COLOR UR: NORMAL
CREAT SERPL-MCNC: 1.16 MG/DL (ref 0.5–1.3)
CRP SERPL-MCNC: 1.2 MG/DL
EC STX1 GENE STL QL NAA+PROBE: NOT DETECTED
EC STX2 GENE STL QL NAA+PROBE: NOT DETECTED
EGFRCR SERPLBLD CKD-EPI 2021: 79 ML/MIN/1.73M*2
EOSINOPHIL # BLD AUTO: 0.08 X10*3/UL (ref 0–0.7)
EOSINOPHIL NFR BLD AUTO: 1.5 %
ERYTHROCYTE [DISTWIDTH] IN BLOOD BY AUTOMATED COUNT: 13.2 % (ref 11.5–14.5)
FENTANYL+NORFENTANYL UR QL SCN: ABNORMAL
GLUCOSE SERPL-MCNC: 89 MG/DL (ref 74–99)
GLUCOSE UR STRIP.AUTO-MCNC: NORMAL MG/DL
HCT VFR BLD AUTO: 37.5 % (ref 41–52)
HGB BLD-MCNC: 12.3 G/DL (ref 13.5–17.5)
HIV 1+2 AB+HIV1 P24 AG SERPL QL IA: NONREACTIVE
HOLD SPECIMEN: NORMAL
IMM GRANULOCYTES # BLD AUTO: 0.01 X10*3/UL (ref 0–0.7)
IMM GRANULOCYTES NFR BLD AUTO: 0.2 % (ref 0–0.9)
KETONES UR STRIP.AUTO-MCNC: NEGATIVE MG/DL
LEUKOCYTE ESTERASE UR QL STRIP.AUTO: NEGATIVE
LYMPHOCYTES # BLD AUTO: 1.32 X10*3/UL (ref 1.2–4.8)
LYMPHOCYTES NFR BLD AUTO: 25.3 %
MAGNESIUM SERPL-MCNC: 2.17 MG/DL (ref 1.6–2.4)
MALARIA SMEAR BLD: NEGATIVE
MCH RBC QN AUTO: 23.4 PG (ref 26–34)
MCHC RBC AUTO-ENTMCNC: 32.8 G/DL (ref 32–36)
MCV RBC AUTO: 71 FL (ref 80–100)
METHADONE UR QL SCN: ABNORMAL
MONOCYTES # BLD AUTO: 1.04 X10*3/UL (ref 0.1–1)
MONOCYTES NFR BLD AUTO: 19.9 %
NEUTROPHILS # BLD AUTO: 2.76 X10*3/UL (ref 1.2–7.7)
NEUTROPHILS NFR BLD AUTO: 52.9 %
NITRITE UR QL STRIP.AUTO: NEGATIVE
NOROVIRUS GI + GII RNA STL NAA+PROBE: NOT DETECTED
NRBC BLD-RTO: 0 /100 WBCS (ref 0–0)
OPIATES UR QL SCN: ABNORMAL
OXYCODONE+OXYMORPHONE UR QL SCN: ABNORMAL
PCP UR QL SCN: ABNORMAL
PH UR STRIP.AUTO: 5.5 [PH]
PHOSPHATE SERPL-MCNC: 3.3 MG/DL (ref 2.5–4.9)
PLASMODIUM AG BLD IA.RAPID: NEGATIVE
PLATELET # BLD AUTO: 242 X10*3/UL (ref 150–450)
POTASSIUM SERPL-SCNC: 4.5 MMOL/L (ref 3.5–5.3)
PROT SERPL-MCNC: 7.6 G/DL (ref 6.4–8.2)
PROT UR STRIP.AUTO-MCNC: NEGATIVE MG/DL
RBC # BLD AUTO: 5.26 X10*6/UL (ref 4.5–5.9)
RBC # UR STRIP.AUTO: NEGATIVE /UL
REVIEWED BY: NORMAL
RV RNA STL NAA+PROBE: NOT DETECTED
SALMONELLA DNA STL QL NAA+PROBE: NOT DETECTED
SHIGELLA DNA SPEC QL NAA+PROBE: DETECTED
SODIUM SERPL-SCNC: 138 MMOL/L (ref 136–145)
SP GR UR STRIP.AUTO: 1.01
UROBILINOGEN UR STRIP.AUTO-MCNC: NORMAL MG/DL
V CHOLERAE DNA STL QL NAA+PROBE: NOT DETECTED
WBC # BLD AUTO: 5.2 X10*3/UL (ref 4.4–11.3)
Y ENTEROCOL DNA STL QL NAA+PROBE: NOT DETECTED

## 2024-09-16 PROCEDURE — 99239 HOSP IP/OBS DSCHRG MGMT >30: CPT

## 2024-09-16 PROCEDURE — 84075 ASSAY ALKALINE PHOSPHATASE: CPT

## 2024-09-16 PROCEDURE — G0378 HOSPITAL OBSERVATION PER HR: HCPCS

## 2024-09-16 PROCEDURE — 81003 URINALYSIS AUTO W/O SCOPE: CPT

## 2024-09-16 PROCEDURE — 36415 COLL VENOUS BLD VENIPUNCTURE: CPT

## 2024-09-16 PROCEDURE — 87506 IADNA-DNA/RNA PROBE TQ 6-11: CPT

## 2024-09-16 PROCEDURE — 80307 DRUG TEST PRSMV CHEM ANLYZR: CPT

## 2024-09-16 PROCEDURE — 83735 ASSAY OF MAGNESIUM: CPT

## 2024-09-16 PROCEDURE — 84100 ASSAY OF PHOSPHORUS: CPT

## 2024-09-16 RX ORDER — SODIUM CHLORIDE, SODIUM LACTATE, POTASSIUM CHLORIDE, CALCIUM CHLORIDE 600; 310; 30; 20 MG/100ML; MG/100ML; MG/100ML; MG/100ML
100 INJECTION, SOLUTION INTRAVENOUS CONTINUOUS
Status: DISCONTINUED | OUTPATIENT
Start: 2024-09-16 | End: 2024-09-16

## 2024-09-16 RX ORDER — ACETAMINOPHEN 325 MG/1
975 TABLET ORAL EVERY 6 HOURS PRN
Status: DISCONTINUED | OUTPATIENT
Start: 2024-09-16 | End: 2024-09-16 | Stop reason: HOSPADM

## 2024-09-16 RX ORDER — LIDOCAINE 560 MG/1
1 PATCH PERCUTANEOUS; TOPICAL; TRANSDERMAL DAILY
Status: DISCONTINUED | OUTPATIENT
Start: 2024-09-16 | End: 2024-09-16 | Stop reason: HOSPADM

## 2024-09-16 RX ORDER — VALACYCLOVIR HYDROCHLORIDE 500 MG/1
500 TABLET, FILM COATED ORAL DAILY
COMMUNITY

## 2024-09-16 RX ORDER — AZITHROMYCIN 500 MG/1
500 TABLET, FILM COATED ORAL DAILY
Qty: 3 TABLET | Refills: 0 | Status: SHIPPED | OUTPATIENT
Start: 2024-09-16 | End: 2024-09-19

## 2024-09-16 RX ADMIN — ACETAMINOPHEN 975 MG: 325 TABLET ORAL at 10:18

## 2024-09-16 NOTE — PROGRESS NOTES
Dejon Fuentes is a 44 y.o. male on day 1 of admission presenting with Intractable diarrhea.      Subjective   Mr. Fuentes seen and examined resting comfortably in bed this AM. He states he has had five liquid, yellow bowel movements today. He states that he does not want an IV placed while in the hospital. He denies fever, chills, night sweats, nausea, vomiting, chest pain, shortness of breath.        Objective     Last Recorded Vitals  /85 (BP Location: Right arm, Patient Position: Sitting)   Pulse 68   Temp 36.6 °C (97.8 °F) (Temporal)   Resp 18   Wt 86.2 kg (190 lb)   SpO2 97%   Intake/Output last 3 Shifts:  No intake or output data in the 24 hours ending 09/16/24 1129    Admission Weight  Weight: 86.2 kg (190 lb) (09/15/24 1847)    Daily Weight  09/15/24 : 86.2 kg (190 lb)    Image Results  OCT, Retina - OU - Both Eyes  Right Eye  Quality was good. Scan locations included subfoveal. Progression has   improved. Findings include normal observations.     Left Eye  Quality was good. Scan locations included subfoveal. Progression has   improved. Findings include normal observations.     Notes  Retinal multimodal imaging including photography was completed, and the   findings are described in the examination.      Physical Exam  Constitutional:       General: He is not in acute distress.  HENT:      Head: Normocephalic.      Mouth/Throat:      Mouth: Mucous membranes are moist.   Eyes:      Extraocular Movements: Extraocular movements intact.      Conjunctiva/sclera: Conjunctivae normal.      Pupils: Pupils are equal, round, and reactive to light.   Cardiovascular:      Rate and Rhythm: Normal rate and regular rhythm.      Pulses: Normal pulses.      Heart sounds: Normal heart sounds. No murmur heard.     No friction rub. No gallop.   Pulmonary:      Effort: Pulmonary effort is normal. No respiratory distress.      Breath sounds: Normal breath sounds. No stridor. No wheezing, rhonchi or  rales.   Abdominal:      General: Abdomen is flat. Bowel sounds are normal. There is no distension.      Palpations: Abdomen is soft. There is no mass.      Tenderness: There is no abdominal tenderness. There is no guarding or rebound.   Musculoskeletal:      Right lower leg: No edema.      Left lower leg: No edema.   Skin:     General: Skin is warm and dry.      Coloration: Skin is not jaundiced.   Neurological:      Mental Status: He is alert and oriented to person, place, and time.         Relevant Results  Scheduled medications  lidocaine, 1 patch, transdermal, Daily  ondansetron, 4 mg, intravenous, Once      Continuous medications     PRN medications  PRN medications: acetaminophen  Results for orders placed or performed during the hospital encounter of 09/15/24 (from the past 24 hour(s))   CBC and Auto Differential   Result Value Ref Range    WBC 5.2 4.4 - 11.3 x10*3/uL    nRBC 0.0 0.0 - 0.0 /100 WBCs    RBC 5.26 4.50 - 5.90 x10*6/uL    Hemoglobin 12.3 (L) 13.5 - 17.5 g/dL    Hematocrit 37.5 (L) 41.0 - 52.0 %    MCV 71 (L) 80 - 100 fL    MCH 23.4 (L) 26.0 - 34.0 pg    MCHC 32.8 32.0 - 36.0 g/dL    RDW 13.2 11.5 - 14.5 %    Platelets 242 150 - 450 x10*3/uL    Neutrophils % 52.9 40.0 - 80.0 %    Immature Granulocytes %, Automated 0.2 0.0 - 0.9 %    Lymphocytes % 25.3 13.0 - 44.0 %    Monocytes % 19.9 2.0 - 10.0 %    Eosinophils % 1.5 0.0 - 6.0 %    Basophils % 0.2 0.0 - 2.0 %    Neutrophils Absolute 2.76 1.20 - 7.70 x10*3/uL    Immature Granulocytes Absolute, Automated 0.01 0.00 - 0.70 x10*3/uL    Lymphocytes Absolute 1.32 1.20 - 4.80 x10*3/uL    Monocytes Absolute 1.04 (H) 0.10 - 1.00 x10*3/uL    Eosinophils Absolute 0.08 0.00 - 0.70 x10*3/uL    Basophils Absolute 0.01 0.00 - 0.10 x10*3/uL   Comprehensive metabolic panel   Result Value Ref Range    Glucose 89 74 - 99 mg/dL    Sodium 138 136 - 145 mmol/L    Potassium 3.8 3.5 - 5.3 mmol/L    Chloride 103 98 - 107 mmol/L    Bicarbonate 29 21 - 32 mmol/L    Anion  Gap 10 10 - 20 mmol/L    Urea Nitrogen 15 6 - 23 mg/dL    Creatinine 1.37 (H) 0.50 - 1.30 mg/dL    eGFR 65 >60 mL/min/1.73m*2    Calcium 9.1 8.6 - 10.6 mg/dL    Albumin 4.2 3.4 - 5.0 g/dL    Alkaline Phosphatase 58 33 - 120 U/L    Total Protein 7.2 6.4 - 8.2 g/dL    AST 16 9 - 39 U/L    Bilirubin, Total 0.3 0.0 - 1.2 mg/dL    ALT 28 10 - 52 U/L   Hepatitis panel, acute   Result Value Ref Range    Hepatitis B Surface AG Nonreactive Nonreactive    Hepatitis A  AB- IgM Nonreactive Nonreactive    Hepatitis B Core AB; IgM Nonreactive Nonreactive    Hepatitis C AB Nonreactive Nonreactive   Blood Culture    Specimen: Peripheral Venipuncture; Blood culture   Result Value Ref Range    Blood Culture Loaded on Instrument - Culture in progress    Bilirubin, Direct   Result Value Ref Range    Bilirubin, Direct 0.0 0.0 - 0.3 mg/dL   Blood Culture    Specimen: Peripheral Venipuncture; Blood culture   Result Value Ref Range    Blood Culture Loaded on Instrument - Culture in progress    Magnesium   Result Value Ref Range    Magnesium 1.68 1.60 - 2.40 mg/dL   Iron and TIBC   Result Value Ref Range    Iron 31 (L) 35 - 150 ug/dL    UIBC 272 110 - 370 ug/dL    TIBC 303 240 - 445 ug/dL    % Saturation 10 (L) 25 - 45 %   Haptoglobin   Result Value Ref Range    Haptoglobin 111 30 - 200 mg/dL   Lactate Dehydrogenase   Result Value Ref Range     84 - 246 U/L   Ferritin   Result Value Ref Range    Ferritin 193 20 - 300 ng/mL   C-reactive protein   Result Value Ref Range    C-Reactive Protein 1.20 (H) <1.00 mg/dL   HIV 1/2 Antigen/Antibody Screen with Reflex to Confirmation   Result Value Ref Range    HIV 1/2 Antigen/Antibody Screen with Reflex to Confirmation Nonreactive Nonreactive   Sars-CoV-2 PCR   Result Value Ref Range    Coronavirus 2019, PCR Not Detected Not Detected   Influenza A, and B PCR   Result Value Ref Range    Flu A Result Not Detected Not Detected    Flu B Result Not Detected Not Detected   Drug Screen, Urine   Result  Value Ref Range    Amphetamine Screen, Urine Presumptive Negative Presumptive Negative    Barbiturate Screen, Urine Presumptive Negative Presumptive Negative    Benzodiazepines Screen, Urine Presumptive Positive (A) Presumptive Negative    Cannabinoid Screen, Urine Presumptive Negative Presumptive Negative    Cocaine Metabolite Screen, Urine Presumptive Negative Presumptive Negative    Fentanyl Screen, Urine Presumptive Negative Presumptive Negative    Opiate Screen, Urine Presumptive Positive (A) Presumptive Negative    Oxycodone Screen, Urine Presumptive Positive (A) Presumptive Negative    PCP Screen, Urine Presumptive Negative Presumptive Negative    Methadone Screen, Urine Presumptive Negative Presumptive Negative   Urinalysis with Reflex Microscopic   Result Value Ref Range    Color, Urine Light-Yellow Light-Yellow, Yellow, Dark-Yellow    Appearance, Urine Clear Clear    Specific Gravity, Urine 1.010 1.005 - 1.035    pH, Urine 5.5 5.0, 5.5, 6.0, 6.5, 7.0, 7.5, 8.0    Protein, Urine NEGATIVE NEGATIVE, 10 (TRACE), 20 (TRACE) mg/dL    Glucose, Urine Normal Normal mg/dL    Blood, Urine NEGATIVE NEGATIVE    Ketones, Urine NEGATIVE NEGATIVE mg/dL    Bilirubin, Urine NEGATIVE NEGATIVE    Urobilinogen, Urine Normal Normal mg/dL    Nitrite, Urine NEGATIVE NEGATIVE    Leukocyte Esterase, Urine NEGATIVE NEGATIVE   Comprehensive Metabolic Panel   Result Value Ref Range    Glucose 89 74 - 99 mg/dL    Sodium 138 136 - 145 mmol/L    Potassium 4.5 3.5 - 5.3 mmol/L    Chloride 105 98 - 107 mmol/L    Bicarbonate 24 21 - 32 mmol/L    Anion Gap 14 10 - 20 mmol/L    Urea Nitrogen 12 6 - 23 mg/dL    Creatinine 1.16 0.50 - 1.30 mg/dL    eGFR 79 >60 mL/min/1.73m*2    Calcium 9.4 8.6 - 10.6 mg/dL    Albumin 4.5 3.4 - 5.0 g/dL    Alkaline Phosphatase 60 33 - 120 U/L    Total Protein 7.6 6.4 - 8.2 g/dL    AST 26 9 - 39 U/L    Bilirubin, Total 0.5 0.0 - 1.2 mg/dL    ALT 29 10 - 52 U/L   Magnesium   Result Value Ref Range    Magnesium  2.17 1.60 - 2.40 mg/dL   Phosphorus   Result Value Ref Range    Phosphorus 3.3 2.5 - 4.9 mg/dL      Assessment/Plan      Dejon Fuentes is a 44 y.o. male with a past history of Behcet's disease who is presenting with a 3-day history of diarrhea and 3 week prior hx of undulating fevers, joint pain, eye yellowness and fatigue. Infectious diarrhea is likely given the travel history and diarrhea presentation, but previous undulating fevers and yellow eyes raise suspicion for an underlying condition like malaria, leptospirosis, or a parasitic infection. Malaria should be prioritized given the geographic history and systemic symptoms. A possible Behçet’s flare could be contributing as well, particularly given the discontinuation of Humira however less likely.      #Diarrhea  :: given Hx and sxs likely Infectious  :: no recent Abx so C.Diff unlikely  :: Flu/covid negative  - Stool PCR  - patient does not want intravenous fluid resuscitation. States he will take PO.   -ID consulted appreciate recs     #undulating fevers, joint pain, yellow sclera  :: DDX: Malaria, Dengue Fever, leptospirosis  :: Hepatits panel negative  :: HIV negative  - LDH, Haptoglobin normal  -Blood smear, Dengue fever PCR, Parasite smear, bcx2   -ID consult     #DARIA, improving  - Likely iso of hypovolemia 2/2 diarrhea  - Will trend Cr and consider further workup if not improving  -Improved this AM to 1.16 from 1.37      #Behcet's Disease  :: patient was presribed Humira however was not taking for at least 6 weeks  - not likely in acute flare  - Encourage medical adherence on dc   -will hold Humira right now in setting of acute illness. Patient not taking currently.      F: PRN, patient elects to not get IV placed  E: K>4, Phos>3, Mg>2  N:Regular diet  A:patient does not want IV placed  Code status: Full code (confirmed on admission)  DVT ppx: Lovenox  NOK: Patient does not provide NOK       Padilla Brantley MD PGY-1

## 2024-09-16 NOTE — H&P
Subjective    History Of Present Illness  Dejon Fuentes is a 44 y.o. 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  Patient claims diarrhea started after he ingested some food and Ric 4 days ago.  The diarrhea is liquid in nature, yellow in color, approximately 10 bowel movements per day, nonpainful.   Patient endorses generalized weakness and  good p.o. intake . Patient denies any nausea, vomiting, fever, cough, abdominal pain, dizziness.    Patient endorses recent travel to [facility] ([facility] [place] for 6 weeks or travel.  Patient endorses that on August 22 he started having undulating fevers all day, joint pain, yellow eyes, and generalized fatigue.  This episode resolved approximately 5 weeks ago for total of 3 weeks.  Patient endorses that medications did not seem to relieve the fever and that he did not see a physician during that.    In the ED vitals were stable, CMP was significant for creatinine of 1.37 (baseline 1.1) .  CBC significant for hemoglobin of 12.3, MCV of 71, normal white count and platelets.  Hepatitis panel was nonreactive.  Patient received 1 L IVF    Of note patient endorses not taking his humira for the past 6 weeks 2/2 difficulties adhering to it while traveling.          ED Course:    Vitals:  Vitals:    09/15/24 1847   BP: (!) 140/96   Pulse: 89   Resp: 16   Temp: 36.6 °C (97.9 °F)   SpO2: 98%   Weight: 86.2 kg (190 lb)   Height: 1.829 m (6')        Labs:     Labs Reviewed   CBC WITH AUTO DIFFERENTIAL - Abnormal       Result Value    WBC 5.2      nRBC 0.0      RBC 5.26      Hemoglobin 12.3 (*)     Hematocrit 37.5 (*)     MCV 71 (*)     MCH 23.4 (*)     MCHC 32.8      RDW 13.2      Platelets 242      Neutrophils % 52.9      Immature Granulocytes %, Automated 0.2      Lymphocytes % 25.3      Monocytes % 19.9      Eosinophils % 1.5      Basophils % 0.2      Neutrophils Absolute 2.76       Immature Granulocytes Absolute, Automated 0.01      Lymphocytes Absolute 1.32      Monocytes Absolute 1.04 (*)     Eosinophils Absolute 0.08      Basophils Absolute 0.01     COMPREHENSIVE METABOLIC PANEL - Abnormal    Glucose 89      Sodium 138      Potassium 3.8      Chloride 103      Bicarbonate 29      Anion Gap 10      Urea Nitrogen 15      Creatinine 1.37 (*)     eGFR 65      Calcium 9.1      Albumin 4.2      Alkaline Phosphatase 58      Total Protein 7.2      AST 16      Bilirubin, Total 0.3      ALT 28     IRON AND TIBC - Abnormal    Iron 31 (*)     UIBC 272      TIBC 303      % Saturation 10 (*)    BLOOD CULTURE - Normal    Blood Culture Loaded on Instrument - Culture in progress     BLOOD CULTURE - Normal    Blood Culture Loaded on Instrument - Culture in progress     SARS-COV-2 PCR - Normal    Coronavirus 2019, PCR Not Detected     INFLUENZA A AND B PCR - Normal    Flu A Result Not Detected      Flu B Result Not Detected     HEPATITIS PANEL, ACUTE - Normal    Hepatitis B Surface AG Nonreactive      Hepatitis A  AB- IgM Nonreactive      Hepatitis B Core AB; IgM Nonreactive      Hepatitis C AB Nonreactive     BILIRUBIN, DIRECT - Normal    Bilirubin, Direct 0.0     MAGNESIUM - Normal    Magnesium 1.68     HAPTOGLOBIN - Normal    Haptoglobin 111     LACTATE DEHYDROGENASE - Normal         FERRITIN - Normal    Ferritin 193     STOOL PATHOGEN PANEL, PCR   DENGUE FEVER VIRUS ABS IGG, IGM   MALARIA SMEAR AND PATHOLOGIST REVIEW    Narrative:     The following orders were created for panel order Malaria Smear and Pathologist Review.  Procedure                               Abnormality         Status                     ---------                               -----------         ------                     Malaria Smear[[id number]]                                    In process                 Pathologist Review-Blood...[[id number]]                      In process                   Please view results for these  tests on the individual orders.   MALARIA SMEAR   PATH REVIEW-BLOOD PARASITE         Imaging:  No orders to display        Interventions  Medications   lactated Ringer's bolus 1,000 mL (1,000 mL intravenous Not Given 9/15/24 2059)   ondansetron (Zofran) injection 4 mg (4 mg intravenous Not Given 9/15/24 2059)   oxyCODONE (Roxicodone) immediate release tablet 5 mg (5 mg oral Given 9/15/24 2029)          Past Medical History   has a past medical history of Behcet's disease (Multi) (07/12/2022), Contact with and (suspected) exposure to unspecified communicable disease, Encounter for general adult medical examination without abnormal findings (08/02/2021), Personal history of diseases of the blood and blood-forming organs and certain disorders involving the immune mechanism, Personal history of malignant neoplasm, unspecified (06/30/2016), Personal history of other diseases of male genital organs (03/17/2021), Personal history of other diseases of male genital organs (02/28/2021), Personal history of other endocrine, nutritional and metabolic disease (06/30/2016), Personal history of other specified conditions (02/26/2021), and Personal history of other specified conditions (04/26/2019).    Home Medications  Current Outpatient Medications   Medication Instructions    acetaminophen (Tylenol) 325 mg tablet oral    acetaminophen-codeine (Tylenol w/ Codeine #3) 300-30 mg tablet oral, 4 times daily    adalimumab (HUMIRA PEN) 80 mg, subcutaneous, See admin instructions    anastrozole (Arimidex) 1 mg tablet oral    apremilast (Otezla) 30 mg tablet 1 tablet, oral, Daily RT    ascorbic acid (Vitamin C) 1,000 mg tablet oral    atovaquone (Mepron) 750 mg/5 mL suspension     azithromycin (Zithromax) 250 mg tablet     benzocaine (Orajel) 10 % mucosal gel Every 12 hours PRN    benzoyl peroxide 5 % external wash Use this to wash affected areas daily. Beware that this can bleach towels    betamethasone, augmented, (Diprolene) 0.05 %  ointment 1 Application    busPIRone (Buspar) 5 mg tablet     colchicine 0.6 mg tablet 1 tablet, oral, Daily RT    cyanocobalamin (VITAMIN B-12) 1,000 mcg, oral, Daily RT    dextran 70-hypromellose (Artificial Tears,[id number],) 0.1-0.3 % ophthalmic solution ophthalmic (eye)    EPINEPHrine 0.3 mg/0.3 mL injection syringe 0.3 mL, intramuscular    ergocalciferol (Vitamin D-2) 1.25 MG (99623 UT) capsule Once weekly With a meal containing healthy oils and fats.    famotidine (PEPCID) 20 mg, oral, 2 times daily    folic acid (FOLVITE) 0.4 mg, oral, Daily    glutamine, bulk, powder powder 5 g, miscellaneous, Daily RT    guaiFENesin (Mucinex) 600 mg 12 hr tablet oral    Lactobacillus acidoph-L.bulgar 1 million cell tablet tablet 1 tablet, oral, Daily    lactobacillus acidophilus (Lactobacillus acidoph-L.bulgar) tablet tablet     lidocaine (Xylocaine) 4 % (40 mg/mL) external solution oral    melatonin 10 mg tablet extended release 1 tablet, oral, Nightly    meloxicam (MOBIC) 7.5 mg, oral, Daily RT    methotrexate (TREXALL) 12.5 mg, oral, Once Weekly    omega-3 fatty acids-fish oil 300-1,000 mg capsule oral    psyllium husk, aspartame, (Metamucil Sugar-Free, aspart,) 3.4 gram/5.8 gram powder 1 Scoop, oral, Daily        Surgical History   has a past surgical history that includes Other surgical history (05/08/2020); Other surgical history (12/02/2019); and MR angio head w and wo IV contrast (3/19/2021).     Social History   reports that he has never smoked. He has never been exposed to tobacco smoke. He has never used smokeless tobacco. He reports that he does not drink alcohol and does not use drugs.    Family History  No family history on file.     Allergies  Gadolinium-containing contrast media, Ibuprofen, Iohexol, Other, Penicillins, Prednisone, Sertraline, Shellfish containing products, Shellfish derived, and Ciprofloxacin          Objective     Last Recorded Vitals  Blood pressure (!) 140/96, pulse 89, temperature 36.6  °C (97.9 °F), resp. rate 16, height 1.829 m (6'), weight 86.2 kg (190 lb), SpO2 98%.    Physical Exam    Constitutional: Well-developed male in no acute distress.  HEENT: Normocephalic, atraumatic. PERRL. EOMI. No cervical lymphadenopathy.  Respiratory: CTA bilaterally. No wheezes, rales, or rhonchi. Normal respiratory effort.  Cardiovascular: RRR. No murmurs, gallops, or rubs. No JVD. Radial pulses 2+.  Abdominal: Soft, nondistended, nontender to palpation. Bowel sounds present. No hepatosplenomegaly or masses. No CVA tenderness.  Neuro: CN II-XII intact. UE and LE strength 5/5 bilaterally and sensation intact. Normal FTN testing.  MSK: No LE edema bilaterally.  Skin: Warm, dry. No rashes or wounds.  Psych: Appropriate mood and affect.    Relevant Results  Results from last 7 days   Lab Units 09/15/24  2025   WBC AUTO x10*3/uL 5.2   HEMOGLOBIN g/dL 12.3*   HEMATOCRIT % 37.5*   PLATELETS AUTO x10*3/uL 242       Results from last 7 days   Lab Units 09/15/24  2025   SODIUM mmol/L 138   POTASSIUM mmol/L 3.8   CHLORIDE mmol/L 103   CO2 mmol/L 29   BUN mg/dL 15   CREATININE mg/dL 1.37*   CALCIUM mg/dL 9.1   PROTEIN TOTAL g/dL 7.2   BILIRUBIN TOTAL mg/dL 0.3   ALK PHOS U/L 58   ALT U/L 28   AST U/L 16   GLUCOSE mg/dL 89         No results found.         Assessment/Plan   Dejon Fuentes is a 44 y.o. male with a past history of Behcet's disease who is presenting with a 3-day history of diarrhea and 3 week prior hx of undulating fevers, joint pain, eye yellowness and fatigue. Infectious diarrhea is likely given the travel history and diarrhea presentation, but previous undulating fevers and yellow eyes raise suspicion for an underlying condition like malaria, leptospirosis, or a parasitic infection. Malaria should be prioritized given the geographic history and systemic symptoms. A possible Behçet’s flare could be contributing as well, particularly given the discontinuation of Humira however less likely.      #Diarrhea  :: given Hx and sxs likely Infectious  :: no recent Abx so C.Diff unlikely  - Stool PCR  - supportive mgmt s/p 1L IVF in ED  [ ] ID consulted appreciate recs    #undulating fevers, joint pain, yellow sclera  :: DDX: Malaria, Dengue Fever, leptospirosis  - Hepatits panel pending  - LDH, Haptoglobin normal  -Blood smear, Dengue fever PCR, Parasite smear, bcx2 , HIV panel pending    #DARIA  - Likely iso of hypovolemia 2/2 diarrhea  - Will trend Cr and consider further workup if not improving      #Behcet's Disease  :: patient was presribed Humira however was not taking for at least 6 weeks  - not likely in acute flare  - Encourage medical adherence on dc   [ ] Discuss adequateness of adminsterating humira dose when patient not in acute illness.      Code status: Full  DVT ppx: Lovenox  Access: PIV  NOK: No NOK           Genaro Noonan MD  PGY-2 Internal Medicine

## 2024-09-16 NOTE — PROGRESS NOTES
Referred by Jerry Rivera MD    Primary MD: Carol Miranda, APRN-CNP    Reason For Consult  Diarrhea in returning traveler    History Of Present Illness  Dejon Fuentes is a 44 y.o. male presenting with with past medical history of Behcet's disease on Humira last dose prior to his travels.  Patient recently returned from a 6 week trip to [facility].  Prior to his trip, he sought no pretravel medical care until can no preventative vaccines or prophylactic medications.  He presented to the emergency department on 9/15/2024 with 3 days of nausea, joint pain, and copious watery diarrhea.  This developed after he ate food at a late over in an airport in [facility].  The diarrhea was nonbloody and not associated with emesis.  There was no associated nausea, vomiting, abdominal pain, or dizziness.  He was afebrile, hemodynamically stable.    He gave initial history to the ER staff that on 8/22/2024, he began experiencing waxing and waning fevers, joint pain, and scleral icterus.  This was accompanied by generalized fatigue.  He sought no medical care in country and his illness resolves on its own after about 3 weeks.  Antipyretics did not help with fevers or symptoms.  It was based on his history of earlier illness that they contacted me overnight for recommendations on laboratory testing.  I advised that [place] there was no malaria nor yellow fever and recommended routine testing with complete blood count, comprehensive metabolic panel, acute hepatitis panel as well as stool studies and dengue fever antibodies (this is a send-out test).       Laboratory studies in the emergency department BUN/creatinine of 15/1.37.  Normal LFTs.  WBC count was 5.2.  Hemoglobin 12.3, and platelets 242.  Differential was normal.  A malaria smear x 1 was performed and was negative.  Stool pathogen PCR was positive for Shigella.  Urinalysis was negative.  Urine tox screen was  positive for oxycodone, benzodiazepines, and 2 sets of blood cultures were obtained. He tested negative for flu and Covid-19.    He refused IV fluids and was hydrated orally.      I WAS UNABLE TO OBTAIN INTERVIEW WITH PATIENT PERSONALLY AS HE WAS NOT PRESENT IN THE EMERGENCY ROOM ON MULTIPLE ATTEMPTS TO SEE HIM.  WE LOOKED IN ER WAITING ROOM AND ANNOUNCED HIS NAME BUT THERE WAS NO RESPONSE TWICE.      Past Medical History  He has a past medical history of Behcet's disease (Multi) (07/12/2022), Contact with and (suspected) exposure to unspecified communicable disease, Encounter for general adult medical examination without abnormal findings (08/02/2021), Personal history of diseases of the blood and blood-forming organs and certain disorders involving the immune mechanism, Personal history of malignant neoplasm, unspecified (06/30/2016), Personal history of other diseases of male genital organs (03/17/2021), Personal history of other diseases of male genital organs (02/28/2021), Personal history of other endocrine, nutritional and metabolic disease (06/30/2016), Personal history of other specified conditions (02/26/2021), and Personal history of other specified conditions (04/26/2019).    Surgical History  He has a past surgical history that includes Other surgical history (05/08/2020); Other surgical history (12/02/2019); and MR angio head w and wo IV contrast (3/19/2021).     Social History     Occupational History    Not on file   Tobacco Use    Smoking status: Never     Passive exposure: Never    Smokeless tobacco: Never   Vaping Use    Vaping status: Never Used   Substance and Sexual Activity    Alcohol use: Never    Drug use: Never    Sexual activity: Not on file     Travel History   Travel since 08/16/24        Location Start Date End Date     [place] 08/16/24 (defaulted) 09/16/24 (defaulted)     [place]16[place]24 (defaulted) 09/16/24 (defaulted)              Family History  No family history on  file.  Allergies  Gadolinium-containing contrast media, Ibuprofen, Iohexol, Other, Penicillins, Prednisone, Sertraline, Shellfish containing products, Shellfish derived, and Ciprofloxacin       There is no immunization history on file for this patient.  Medications  Home medications:  (Not in a hospital admission)  Current medications:  Scheduled medications  lidocaine, 1 patch, transdermal, Daily  ondansetron, 4 mg, intravenous, Once    Continuous medications     PRN medications  PRN medications: acetaminophen    Review of Systems: unable to personally obtain     Objective  Range of Vitals (last 24 hours)  Heart Rate:  [68-89]   Temperature:  [36.6 °C (97.8 °F)-36.6 °C (97.9 °F)]   Respirations:  [16-18]   BP: (140)/(85-96)   Height:  [182.9 cm (6')]   Weight:  [86.2 kg (190 lb)]   Pulse Ox:  [97 %-98 %]   Daily Weight  09/15/24 : 86.2 kg (190 lb)    Body mass index is 25.77 kg/m².     Physical Exam  Patient not available on 2 attempts to see in ER despite being told by nurse that we were on our way to see him.    Relevant Results  Labs  Results from last 72 hours   Lab Units 09/15/24  2025   WBC AUTO x10*3/uL 5.2   HEMOGLOBIN g/dL 12.3*   HEMATOCRIT % 37.5*   PLATELETS AUTO x10*3/uL 242   NEUTROS PCT AUTO % 52.9   LYMPHS PCT AUTO % 25.3   MONOS PCT AUTO % 19.9   EOS PCT AUTO % 1.5     Results from last 72 hours   Lab Units 09/16/24  0541 09/15/24  2025   SODIUM mmol/L 138 138   POTASSIUM mmol/L 4.5 3.8   CHLORIDE mmol/L 105 103   CO2 mmol/L 24 29   BUN mg/dL 12 15   CREATININE mg/dL 1.16 1.37*   GLUCOSE mg/dL 89 89   CALCIUM mg/dL 9.4 9.1   ANION GAP mmol/L 14 10   EGFR mL/min/1.73m*2 79 65   PHOSPHORUS mg/dL 3.3  --      Results from last 72 hours   Lab Units 09/16/24  0541 09/15/24  2025   ALK PHOS U/L 60 58   BILIRUBIN TOTAL mg/dL 0.5 0.3   BILIRUBIN DIRECT mg/dL  --  0.0   PROTEIN TOTAL g/dL 7.6 7.2   ALT U/L 29 28   AST U/L 26 16   ALBUMIN g/dL 4.5 4.2     Estimated Creatinine Clearance: 88.3 mL/min (by C-G  formula based on SCr of 1.16 mg/dL).  C-Reactive Protein   Date Value Ref Range Status   09/15/2024 1.20 (H) <1.00 mg/dL Final   11/28/2023 <0.10 <1.00 mg/dL Final     CRP   Date Value Ref Range Status   07/13/2022 <0.10 mg/dL Final     Comment:     REF VALUE  < 1.00       Sedimentation Rate   Date Value Ref Range Status   11/28/2023 4 0 - 15 mm/h Final   01/19/2021 2 0 - 15 mm/h Final   07/02/2020 11 0 - 15 mm/h Final     HIV 1/2 Antigen/Antibody Screen with Reflex to Confirmation   Date Value Ref Range Status   09/15/2024 Nonreactive Nonreactive Final     Hepatitis C AB   Date Value Ref Range Status   09/15/2024 Nonreactive Nonreactive Final     Comment:     Results from patients taking biotin supplements or receiving high-dose biotin therapy should be interpreted with caution due to possible interference with this test. Providers may contact their local laboratory for further information.     Microbiology  No results found for the last 90 days.    Stool Pathogen Panel, PCR  Order: [id number]   Collected 9/16/2024 04:00       Status: Final result       Visible to patient: Yes (seen)    Specimen Information: Stool   0 Result Notes      Component  Ref Range & Units    Campylobacter Group  Not Detected Not Detected   Salmonella species  Not Detected Not Detected   Shigella species  Not Detected Detected Abnormal    Vibrio Group  Not Detected Not Detected   Yersinia Enterocolitica  Not Detected Not Detected   Shiga Toxin 1  Not Detected Not Detected   Shiga Toxin 2  Not Detected Not Detected   Norovirus GI/GII  Not Detected Not Detected   Rotavirus A  Not Detected Not Detected   Resulting Agency [facility]              Specimen Collected: 09/16/24 04:00 Last Resulted: 09/16/24 12:38        Imaging  None     Assessment/Plan   ATTEMPTED TO SEE PATIENT TWICE IN ER TODAY.  HE WAS NOT IN UNIT OR WAITING ROOM ON EITHER OCCASION.  WE WENT OUT TO WAITING ROOM TO FIND HIM.      43yo gentleman returning from [facility]  with intractable, watery diarrhea without fever, leukocytosis or elevated hepatic enzymes.    Stool pathogen PCR (+) for Shigella.    Given that he is immunocompromised (Humira  typically for Bechet'sw), would treat infection as opposed to allowing natural resolution.  Can utilize Azithromycin 500mg PO daily x 3 days or Bactrim DS BID x 5 days.  Agree with PCP follow-up and holding Humira pending follow-up with his PCP.    Based upon added history (chart review) of bilateral eye pain,would add Leptospira Abs to bloodwork      Jaky Sheikh MD.  (please reach through [employer])  Infectious Diseases, Senior Attending Physician  Team B (non-fellow service), EPIC Chat

## 2024-09-16 NOTE — DISCHARGE INSTRUCTIONS
Mr. Fuentes,    You were admitted to the hospital for intractable diarrhea. While in the hospital, many tests were completed to determine the cause of your diarrhea. Tests came back positive for Shigella, which is a bacteria that causes diarrhea in those who have recently travelled. We have sent a prescription for Azithromycin, an antibiotic that you will take once daily for three days to help treat this infection. As we discussed, please continue to drink plenty of fluids and eat as you are able while recovering from this illness.     Please discuss when to restart your Humira with your primary care physician. Please set up an appointment with them in one week.    Please return to the emergency department with any worsening of symptoms or additional concerns.     Thank you,  Your  Care Team

## 2024-09-16 NOTE — PROGRESS NOTES
"Pharmacy Medication History Review    Dejon Fuentes is a 44 y.o. male admitted for Diarrhea, unspecified type. Pharmacy reviewed the patient's [id number] medications and allergies for accuracy.    Medications ADDED:  Valacyclovir 500 mg tablet   Medications CHANGED:  Adalimumab ( Humira Pen ) 80 mg / 0.8 mL pen injector kit pen injector - updated sig to \" Inject 1 pen ( 80 mg ) under the skin 1 time per week. Previous sig stated \" Inject 1 pen under the skin , see administration instructions. \"   Anastrozole 1 mg tablet - updated sig to \" Take 1 tablet by mouth once daily.\" Previous sig stated \" Take by mouth.\"   Omega 3 fatty acids - fish oil 300 - 1,000 mg capsule - updated sig to \" Take 1 capsule once daily.\" Previous sig stated \" Take by mouth.\"     Medications REMOVED / NOT TAKING:   Acetaminophen - codeine 300 - 30 mg tablet - Removed   Apremilast 30 mg tablet - Removed   Ascorbic Acid ( Vitamin C ) 1,000 mg tablet - Removed   Atovaquone 750 mg / 5 mL suspension - Removed   Azithromycin 250 mg tablet - Removed   Benzocaine 10 % mucosal gel - Removed   Betamethasone augmented 0.05 % ointment - Removed   Buspirone 5 mg tablet - Removed   Colchicine 0.6 mg tablet - Removed   Cyanocobalamin 1,000 mcg tablet - Removed   Dextran 70 - hypromellose 0.1 - 0.3 % ( Artificial Tears ) ophthalmic solution - Removed   Epinephrine 0.3 mg / 0.3 mL injection syringe - Removed   Ergocalciferol 1.25 mg ( 78041 UT ) capsule - Removed   Famotidine 20 mg tablet - Removed   Folic Acid 1 mg tablet - Removed   Glutamine , bulk , powder - Removed   Guaifenesin 600 mg 12 hr tablet - Removed   Lactobacillus acidophilus 1 million cell tablet   Lactobacillus acidophilus tablet ( duplicate ) - Removed   Lidocaine ( Xylocaine ) 4 % ( 40 mg / mL)  external solution - Removed   Melatonin 10 mg tablet extended release - Patient not taking.   Meloxicam 7.5 mg tablet - Removed   Methotrexate 2.5 mg tablet - Removed   Pysllium " husk , aspartame ( Metamucil Sugar - Free , aspart ) 3.4 gram / 5.8 gram powder - Patient not taking.     The list below reflects the updated PTA list.       Prior to Admission Medications   Prescriptions Last Dose Informant   acetaminophen (Tylenol) 325 mg tablet 9/15/2024 Self   Sig: Take 1 tablet (325 mg) by mouth every 6 hours if needed.   adalimumab (Humira Pen) 80 mg/0.8 mL pen injector kit pen-injector 9/9/2024 Self   Sig: Inject 1 Pen (80 mg) under the skin 1 (one) time per week.   anastrozole (Arimidex) 1 mg tablet Past Week Self   Sig: Take 1 tablet (1 mg total) by mouth once daily.   benzoyl peroxide 5 % external wash 9/15/2024 Self   Sig: Use this to wash affected areas daily. Beware that this can bleach towels   melatonin 10 mg tablet extended release Not Taking Self   Sig: Take 1 tablet by mouth once daily at bedtime.   Patient not taking: Reported on 9/16/2024   omega-3 fatty acids-fish oil 300-1,000 mg capsule 9/15/2024 Self   Sig: Take 1 capsule (1,000 mg) by mouth once daily.   psyllium husk, aspartame, (Metamucil Sugar-Free, aspart,) 3.4 gram/5.8 gram powder Not Taking Self   Sig: Take 1 Scoop by mouth once daily.   Patient not taking: Reported on 9/16/2024   valACYclovir (Valtrex) 500 mg tablet 9/15/2024 Self   Sig: Take 1 tablet (500 mg) by mouth once daily.      Facility-Administered Medications: None      The list below reflects the updated allergy list. Please review each documented allergy for additional clarification and justification.  Allergies  Reviewed by Joanie Gracia on 9/16/2024        Severity Reactions Comments    Gadolinium-containing Contrast Media Not Specified Swelling     Ibuprofen Not Specified Unknown     Iohexol Not Specified Swelling, Itching     Other Not Specified Other Shrimp, lobster, cockroaches    Penicillins Not Specified Angioedema, Swelling Lips   Lips Lips    Prednisone Not Specified Other This may have caused suicidal ideation and homicidal ideation leading  "to psychiatric admission.  This may have caused suicidal ideation and homicidal ideation leading to psychiatric admission.    This may have caused suicidal ideation and homicidal ideation leading to psychiatric admission.    Sertraline Not Specified Other     Shellfish Containing Products Not Specified Unknown Other reaction(s): Intolerance   Nausea Other reaction(s): Intolerance   Nausea   Other reaction(s): Intolerance   Nausea Nausea    Shellfish Derived Not Specified Nausea/vomiting, Other Nausea   Other reaction(s): Intolerance   Nausea Nausea    Ciprofloxacin Low Other, Rash             Patient declines M2B at discharge.     Sources:   [facility] - no hx found   Patient interview   Patient dispense hx    Chart Review   Care Everywhere       Additional Comments:      Patient is a reliable historian and appears compliant with current home medications.     Patient reports still taking Anastrozole 1 mg tablets however there is no fill hx found.    Patients PTA list has been updated to most current home medications , medications patient reports not taking are marked appropriately.       Joanie Gracia  Pharmacy Technician  09/16/24     Secure Chat preferred   If no response call [id number] or [employer] \"Med Rec\"   "

## 2024-09-16 NOTE — HOSPITAL COURSE
Dejon Fuentes is a 44 y.o. 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  Patient claims diarrhea started after he ingested some food and Ric 4 days ago.  The diarrhea is liquid in nature, yellow in color, approximately 10 bowel movements per day, nonpainful.   Patient endorses generalized weakness and  good p.o. intake . Patient denies any nausea, vomiting, fever, cough, abdominal pain, dizziness.     Patient endorses recent travel to [facility] [place] [place] 6 weeks or travel.  Patient endorses that on August 22 he started having undulating fevers all day, joint pain, yellow eyes, and generalized fatigue.  This episode resolved approximately 5 weeks ago for total of 3 weeks.  Patient endorses that medications did not seem to relieve the fever and that he did not see a physician during that.     In the ED vitals were stable, CMP was significant for creatinine of 1.37 (baseline 1.1) .  CBC significant for hemoglobin of 12.3, MCV of 71, normal white count and platelets.  Hepatitis panel was nonreactive.  Patient received 1 L IVF     Of note patient endorses not taking his humira for the past 6 weeks 2/2 difficulties adhering to it while traveling.    Many labs obtained. Stool PCR positive for Shigella. HIV, Hepatitis panel negative. Flu/COVID negative. Malaria smear negative. Dengue fever PCR sent. Patient discharged on Azithromycin 500 mg x 3 days. To follow up with PCP in one week to determine when to restart Humira and follow resolution of diarrhea.

## 2024-09-16 NOTE — DISCHARGE SUMMARY
Discharge Diagnosis  Intractable diarrhea, Shigella infection    Issues Requiring Follow-Up  To follow up with PCP in one week to determine when to restart Humira and follow resolution of diarrhea.    Discharge Meds     Medication List      START taking these medications     azithromycin 500 mg tablet; Commonly known as: Zithromax; Take 1 tablet   (500 mg) by mouth once daily for 3 days.     CONTINUE taking these medications     acetaminophen 325 mg tablet; Commonly known as: Tylenol   valACYclovir 500 mg tablet; Commonly known as: Valtrex     STOP taking these medications     adalimumab 80 mg/0.8 mL pen injector kit pen-injector; Commonly known   as: Humira Pen   anastrozole 1 mg tablet; Commonly known as: Arimidex   benzoyl peroxide 5 % external wash   melatonin 10 mg tablet extended release   Metamucil Sugar-Free (aspart) 3.4 gram/5.8 gram powder; Generic drug:   psyllium husk (aspartame)   omega-3 fatty acids-fish oil 300-1,000 mg capsule       Test Results Pending At Discharge  Pending Labs       Order Current Status    Dengue Fever Virus Abs IgG, IgM In process    Blood Culture Preliminary result    Blood Culture Preliminary result            Hospital Course  Dejon Fuentes is a 44 y.o. 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  Patient claims diarrhea started after he ingested some food and Ric 4 days ago.  The diarrhea is liquid in nature, yellow in color, approximately 10 bowel movements per day, nonpainful.   Patient endorses generalized weakness and  good p.o. intake . Patient denies any nausea, vomiting, fever, cough, abdominal pain, dizziness.     Patient endorses recent travel to [facility] ([facility] [place] [facility] [place] for 6 weeks or travel.  Patient endorses that on August 22 he started having undulating fevers all day, joint pain, yellow eyes, and generalized fatigue.  This episode resolved approximately 5 weeks  ago for total of 3 weeks.  Patient endorses that medications did not seem to relieve the fever and that he did not see a physician during that.     In the ED vitals were stable, CMP was significant for creatinine of 1.37 (baseline 1.1) .  CBC significant for hemoglobin of 12.3, MCV of 71, normal white count and platelets.  Hepatitis panel was nonreactive.  Patient received 1 L IVF     Of note patient endorses not taking his humira for the past 6 weeks 2/2 difficulties adhering to it while traveling.    Many labs obtained. Stool PCR positive for Shigella. HIV, Hepatitis panel negative. Flu/COVID negative. Malaria smear negative. Dengue fever PCR sent. Patient discharged on Azithromycin 500 mg x 3 days. Patient refused access for IV fluid resuscitation.     To follow up with PCP in one week to determine when to restart Humira and follow resolution of diarrhea.    Pertinent Physical Exam At Time of Discharge  Constitutional:       General: He is not in acute distress.  HENT:      Head: Normocephalic.      Mouth/Throat:      Mouth: Mucous membranes are moist.   Eyes:      Extraocular Movements: Extraocular movements intact.      Conjunctiva/sclera: Conjunctivae normal.      Pupils: Pupils are equal, round, and reactive to light.   Cardiovascular:      Rate and Rhythm: Normal rate and regular rhythm.      Pulses: Normal pulses.      Heart sounds: Normal heart sounds. No murmur heard.     No friction rub. No gallop.   Pulmonary:      Effort: Pulmonary effort is normal. No respiratory distress.      Breath sounds: Normal breath sounds. No stridor. No wheezing, rhonchi or rales.   Abdominal:      General: Abdomen is flat. Bowel sounds are normal. There is no distension.      Palpations: Abdomen is soft. There is no mass.      Tenderness: There is no abdominal tenderness. There is no guarding or rebound.   Musculoskeletal:      Right lower leg: No edema.      Left lower leg: No edema.   Skin:     General: Skin is warm and  dry.      Coloration: Skin is not jaundiced.   Neurological:      Mental Status: He is alert and oriented to person, place, and time.    Outpatient Follow-Up  Patient instructed to schedule follow up appointment with PCP in one week to determine when to restart Humira, follow resolution of diarrhea. He follows with [facility].       Padilla Brantley MD PGY-1

## 2024-09-18 LAB
DENV IGG SER IA-ACNC: 10.06 IV
DENV IGM SER IA-ACNC: 15.54 IV

## 2024-09-19 LAB
BACTERIA BLD CULT: NORMAL
BACTERIA BLD CULT: NORMAL

## 2024-11-21 ENCOUNTER — CLINICAL SUPPORT (OUTPATIENT)
Dept: NUTRITION | Facility: CLINIC | Age: 44
End: 2024-11-21
Payer: COMMERCIAL

## 2024-11-21 NOTE — PROGRESS NOTES
[facility]  Diet Recommendation 1: Healthy Eating  Household Size: 1 Family Member  Interventions: Referral Number: 9th 6 Mo Referral 4.5 yrs (Referrals may not be consecutive)  Interventions: Visit Number: 1 of 6 Visits - Max 6 Visits/Referral Each 6 Mo Period  Education Today: Vegetarian Meal Planning (Discussed Soy Milk vs [place] Milk)  Grains: Whole - 100%  Fruit: 50-75% Fresh  Vegetables: Fresh - 100%  Proteins: 0 Plant-based Items  Dairy: 0-25% Lowfat  Relevant [facility] Inpatient Discharge Items: unable to locate referral.  Initials of RD Assisting Today:

## 2024-12-26 ENCOUNTER — CLINICAL SUPPORT (OUTPATIENT)
Dept: NUTRITION | Facility: CLINIC | Age: 44
End: 2024-12-26
Payer: COMMERCIAL

## 2024-12-26 NOTE — PROGRESS NOTES
[facility]  Diet Recommendation 1: Healthy Eating  Food Intolerance Avoidance: Shellfish  Household Size: 1 Family Member  Interventions: Referral Number: 9th 6 Mo Referral 4.5 yrs (Referrals may not be consecutive)  Interventions: Visit Number: 2 of 6 Visits - Max 6 Visits/Referral Each 6 Mo Period  Grains: Whole - 100%  Fruit: 50-75% Fresh  Vegetables: Fresh - 100%  Proteins: 0 Plant-based Items  Dairy: 0-25% Lowfat  Originating Site of Referral Order: [facility]  Initials of RD Assisting Today: JEM

## 2025-01-21 ENCOUNTER — CLINICAL SUPPORT (OUTPATIENT)
Dept: NUTRITION | Facility: CLINIC | Age: 45
End: 2025-01-21
Payer: COMMERCIAL

## 2025-01-21 NOTE — PROGRESS NOTES
[facility]  Diet Recommendation 1: Healthy Eating  Food Intolerance Avoidance: Shellfish  Household Size: 1 Family Member  Interventions: Referral Number: 9th 6 Mo Referral 4.5 yrs (Referrals may not be consecutive)  Interventions: Visit Number: 3 of 6 Visits - Max 6 Visits/Referral Each 6 Mo Period  Grains: Whole - 100%  Fruit: 50-75% Fresh  Vegetables: Fresh - 100%  Proteins: 0 Plant-based Items  Dairy: 0-25% Lowfat  Originating Site of Referral Order: [facility] Evangelista  Initials of RD Assisting Today: JEM

## 2025-02-18 ENCOUNTER — CLINICAL SUPPORT (OUTPATIENT)
Dept: NUTRITION | Facility: CLINIC | Age: 45
End: 2025-02-18
Payer: COMMERCIAL

## 2025-02-18 NOTE — PROGRESS NOTES
[facility]  Diet Recommendation 1: Healthy Eating  Food Intolerance Avoidance: Shellfish  Household Size: 1 Family Member  Interventions: Referral Number: 9th 6 Mo Referral 4.5 yrs (Referrals may not be consecutive)  Interventions: Visit Number: 4 of 6 Visits - Max 6 Visits/Referral Each 6 Mo Period  Education Today: [employer] Meals  Grains: 25-50% Whole  Fruit: 50-75% Fresh  Vegetables: Fresh - 100%  Proteins: 0 Plant-based Items  Dairy: 0-25% Lowfat  Relevant [facility] Inpatient Discharge Items: laureen browne for referral  Originating Site of Referral Order: [facility] Miranda  Initials of RD Assisting Today: KHRIS

## 2025-03-13 ENCOUNTER — CLINICAL SUPPORT (OUTPATIENT)
Dept: NUTRITION | Facility: CLINIC | Age: 45
End: 2025-03-13
Payer: COMMERCIAL

## 2025-03-13 NOTE — PROGRESS NOTES
[facility]  Diet Recommendation 1: Healthy Eating  Food Intolerance Avoidance: Shellfish  Household Size: 1 Family Member  Interventions: Referral Number: 9th 6 Mo Referral 4.5 yrs (Referrals may not be consecutive)  Interventions: Visit Number: 5 of 6 Visits - Max 6 Visits/Referral Each 6 Mo Period  Education Today: Healthy Recipes  Grains: 0-25% Whole  Fruit: % Fresh  Vegetables: Fresh - 100%  Proteins: 0 Plant-based Items  Dairy: 25-50% Lowfat  Relevant [facility] Inpatient Discharge Items: -  Originating Site of Referral Order: Carol Miranda  Initials of RD Assisting Today: KHRIS

## 2025-03-27 DIAGNOSIS — M35.2: Primary | ICD-10-CM

## 2025-03-27 SDOH — ECONOMIC STABILITY: FOOD INSECURITY: WITHIN THE PAST 12 MONTHS, THE FOOD YOU BOUGHT JUST DIDN'T LAST AND YOU DIDN'T HAVE MONEY TO GET MORE.: SOMETIMES TRUE

## 2025-03-27 SDOH — ECONOMIC STABILITY: FOOD INSECURITY: WITHIN THE PAST 12 MONTHS, YOU WORRIED THAT YOUR FOOD WOULD RUN OUT BEFORE YOU GOT MONEY TO BUY MORE.: SOMETIMES TRUE

## 2025-04-10 ENCOUNTER — CLINICAL SUPPORT (OUTPATIENT)
Dept: NUTRITION | Facility: CLINIC | Age: 45
End: 2025-04-10
Payer: COMMERCIAL

## 2025-04-10 DIAGNOSIS — M35.2: ICD-10-CM

## 2025-04-10 NOTE — PROGRESS NOTES
[facility]  Diet Recommendation 1: Healthy Eating  Food Intolerance Avoidance: Shellfish  Household Size: 1 Family Member  Interventions: Referral Number: 9th 6 Mo Referral 4.5 yrs (Referrals may not be consecutive)  Interventions: Visit Number: 6 of 6 Visits - Max 6 Visits/Referral Each 6 Mo Period  Education Today: Healthy Recipes  Grains: 50-75% Whole  Fruit: Fresh - 100%  Vegetables: Fresh - 100%  Proteins: 0 Plant-based Items  Dairy: 25-50% Lowfat  Relevant [facility] Inpatient Discharge Items: -  Originating Site of Referral Order: Carol Miranda  Initials of RD Assisting Today: KHRIS

## 2025-04-17 ENCOUNTER — OFFICE VISIT (OUTPATIENT)
Dept: OPHTHALMOLOGY | Facility: CLINIC | Age: 45
End: 2025-04-17
Payer: COMMERCIAL

## 2025-04-17 DIAGNOSIS — H52.03 HYPEROPIA WITH PRESBYOPIA OF BOTH EYES: Primary | ICD-10-CM

## 2025-04-17 DIAGNOSIS — H52.4 HYPEROPIA WITH PRESBYOPIA OF BOTH EYES: Primary | ICD-10-CM

## 2025-04-17 DIAGNOSIS — M35.2: ICD-10-CM

## 2025-04-17 PROCEDURE — 92015 DETERMINE REFRACTIVE STATE: CPT | Performed by: STUDENT IN AN ORGANIZED HEALTH CARE EDUCATION/TRAINING PROGRAM

## 2025-04-17 PROCEDURE — 92004 COMPRE OPH EXAM NEW PT 1/>: CPT | Performed by: STUDENT IN AN ORGANIZED HEALTH CARE EDUCATION/TRAINING PROGRAM

## 2025-04-17 ASSESSMENT — VISUAL ACUITY
METHOD: SNELLEN - LINEAR
OS_SC+: -1
OS_SC: 20/20
OD_SC+: -1
OD_SC: 20/20

## 2025-04-17 ASSESSMENT — ENCOUNTER SYMPTOMS
GASTROINTESTINAL NEGATIVE: 0
MUSCULOSKELETAL NEGATIVE: 0
NEUROLOGICAL NEGATIVE: 0
EYES NEGATIVE: 0
CARDIOVASCULAR NEGATIVE: 0
ALLERGIC/IMMUNOLOGIC NEGATIVE: 0
CONSTITUTIONAL NEGATIVE: 0
RESPIRATORY NEGATIVE: 0
PSYCHIATRIC NEGATIVE: 0
HEMATOLOGIC/LYMPHATIC NEGATIVE: 0
ENDOCRINE NEGATIVE: 0

## 2025-04-17 ASSESSMENT — CONF VISUAL FIELD
OD_SUPERIOR_NASAL_RESTRICTION: 0
OD_INFERIOR_NASAL_RESTRICTION: 0
OS_SUPERIOR_TEMPORAL_RESTRICTION: 0
OD_SUPERIOR_TEMPORAL_RESTRICTION: 0
METHOD: COUNTING FINGERS
OD_INFERIOR_TEMPORAL_RESTRICTION: 0
OS_INFERIOR_TEMPORAL_RESTRICTION: 0
OS_INFERIOR_NASAL_RESTRICTION: 0
OS_NORMAL: 1
OS_SUPERIOR_NASAL_RESTRICTION: 0
OD_NORMAL: 1

## 2025-04-17 ASSESSMENT — TONOMETRY
IOP_METHOD: GOLDMANN APPLANATION
OD_IOP_MMHG: 15
OS_IOP_MMHG: 16

## 2025-04-17 ASSESSMENT — REFRACTION_MANIFEST
OD_SPHERE: +0.25
OD_CYLINDER: -0.50
OS_ADD: +1.50
OD_ADD: +1.50
OS_CYLINDER: SPHERE
OD_AXIS: 170
OS_SPHERE: +0.50

## 2025-04-17 ASSESSMENT — SLIT LAMP EXAM - LIDS
COMMENTS: NORMAL
COMMENTS: NORMAL

## 2025-04-17 ASSESSMENT — EXTERNAL EXAM - RIGHT EYE: OD_EXAM: NORMAL

## 2025-04-17 ASSESSMENT — EXTERNAL EXAM - LEFT EYE: OS_EXAM: NORMAL

## 2025-04-17 ASSESSMENT — CUP TO DISC RATIO
OD_RATIO: .40
OS_RATIO: .35

## 2025-04-17 NOTE — PROGRESS NOTES
Assessment/Plan   Diagnoses and all orders for this visit:  Hyperopia with presbyopia of both eyes  -New spec rx released today per patient request. Ocular health wnl for age OU. Monitor 1 year or sooner prn. Refraction billed today.  Behçet disease of small intestine (Multi)  -on exam today no ocular inflammation-eye is quiet  -continue systemic care with medical team  -discussed patient to RTC for any acute vision changes, pain, light sensitivity    RTC 1 year for annual with PENG and JARVIS

## 2025-05-01 ENCOUNTER — CLINICAL SUPPORT (OUTPATIENT)
Dept: NUTRITION | Facility: CLINIC | Age: 45
End: 2025-05-01
Payer: COMMERCIAL

## 2025-05-01 NOTE — PROGRESS NOTES
[facility]  Diet Recommendation 1: Healthy Eating  Food Intolerance Avoidance: Shellfish  Household Size: 1 Family Member  Interventions: Referral Number: 10th 6 Mo Referral 5 yrs (Referrals may not be consecutive)  Interventions: Visit Number: 1 of 6 Visits - Max 6 Visits/Referral Each 6 Mo Period  Education Today: Healthy Recipes  Grains: 0-25% Whole  Fruit: 50-75% Fresh  Vegetables: 50-75% Fresh  Proteins: 4 or more Plant-based Items  Dairy: 0-25% Lowfat  Relevant [facility] Inpatient Discharge Items: no dairy chosen  Originating Site of Referral Order: Carol Miranda  Initials of RD Assisting Today: KHRIS

## 2025-05-08 ENCOUNTER — APPOINTMENT (OUTPATIENT)
Dept: NUTRITION | Facility: CLINIC | Age: 45
End: 2025-05-08
Payer: COMMERCIAL

## 2025-05-20 ENCOUNTER — APPOINTMENT (OUTPATIENT)
Dept: PRIMARY CARE | Facility: CLINIC | Age: 45
End: 2025-05-20
Payer: COMMERCIAL

## 2025-06-16 ENCOUNTER — APPOINTMENT (OUTPATIENT)
Dept: URGENT CARE | Age: 45
End: 2025-06-16

## 2025-07-02 ENCOUNTER — CLINICAL SUPPORT (OUTPATIENT)
Dept: NUTRITION | Facility: CLINIC | Age: 45
End: 2025-07-02
Payer: COMMERCIAL

## 2025-07-02 NOTE — PROGRESS NOTES
[facility]  Diet Recommendation 1: Healthy Eating  Food Intolerance Avoidance: Shellfish  Household Size: 1 Family Member  Interventions: Referral Number: 10th 6 Mo Referral 5 yrs (Referrals may not be consecutive)  Interventions: Visit Number: 2 of 6 Visits - Max 6 Visits/Referral Each 6 Mo Period  Education Today: Healthy Recipes  Grains: 25-50% Whole  Fruit: Fresh - 100%  Vegetables: Fresh - 100%  Proteins: 0 Plant-based Items  Dairy: % Lowfat  Relevant [facility] Inpatient Discharge Items: -  Originating Site of Referral Order: Carol Miranda  Initials of RD Assisting Today: KHRIS

## 2025-08-06 ENCOUNTER — CLINICAL SUPPORT (OUTPATIENT)
Dept: NUTRITION | Facility: CLINIC | Age: 45
End: 2025-08-06
Payer: COMMERCIAL

## 2025-08-06 NOTE — PROGRESS NOTES
[facility]  Diet Recommendation 1: Healthy Eating  Food Intolerance Avoidance: Shellfish  Household Size: 1 Family Member  Interventions: Referral Number: 10th 6 Mo Referral 5 yrs (Referrals may not be consecutive)  Interventions: Visit Number: 3 of 6 Visits - Max 6 Visits/Referral Each 6 Mo Period  Education Today: Healthy Recipes  Grains: 0-25% Whole  Fruit: Fresh - 100%  Vegetables: 50-75% Fresh  Proteins: 0 Plant-based Items  Dairy: % Lowfat  Relevant [facility] Inpatient Discharge Items: -  Originating Site of Referral Order: Carol Miranda  Initials of RD Assisting Today: KHRIS

## 2025-09-03 ENCOUNTER — CLINICAL SUPPORT (OUTPATIENT)
Dept: NUTRITION | Facility: CLINIC | Age: 45
End: 2025-09-03
Payer: COMMERCIAL

## 2025-09-05 ENCOUNTER — APPOINTMENT (OUTPATIENT)
Dept: RHEUMATOLOGY | Facility: CLINIC | Age: 45
End: 2025-09-05
Payer: COMMERCIAL

## 2025-10-31 ENCOUNTER — APPOINTMENT (OUTPATIENT)
Dept: RHEUMATOLOGY | Facility: CLINIC | Age: 45
End: 2025-10-31
Payer: COMMERCIAL

## 2026-04-22 ENCOUNTER — APPOINTMENT (OUTPATIENT)
Dept: OPHTHALMOLOGY | Facility: CLINIC | Age: 46
End: 2026-04-22
Payer: COMMERCIAL